# Patient Record
Sex: FEMALE | Race: WHITE | NOT HISPANIC OR LATINO | Employment: OTHER | URBAN - METROPOLITAN AREA
[De-identification: names, ages, dates, MRNs, and addresses within clinical notes are randomized per-mention and may not be internally consistent; named-entity substitution may affect disease eponyms.]

---

## 2020-09-24 ENCOUNTER — OFFICE VISIT (OUTPATIENT)
Dept: OBSTETRICS AND GYNECOLOGY | Facility: CLINIC | Age: 55
End: 2020-09-24
Payer: MEDICAID

## 2020-09-24 VITALS
SYSTOLIC BLOOD PRESSURE: 122 MMHG | BODY MASS INDEX: 27.25 KG/M2 | HEIGHT: 69 IN | DIASTOLIC BLOOD PRESSURE: 78 MMHG | WEIGHT: 184 LBS

## 2020-09-24 DIAGNOSIS — Z01.419 ROUTINE GYNECOLOGICAL EXAMINATION: Primary | ICD-10-CM

## 2020-09-24 DIAGNOSIS — D17.9 LIPOMA, UNSPECIFIED SITE: ICD-10-CM

## 2020-09-24 PROCEDURE — 99386 PREV VISIT NEW AGE 40-64: CPT | Mod: S$GLB,,, | Performed by: OBSTETRICS & GYNECOLOGY

## 2020-09-24 PROCEDURE — 99386 PR PREVENTIVE VISIT,NEW,40-64: ICD-10-PCS | Mod: S$GLB,,, | Performed by: OBSTETRICS & GYNECOLOGY

## 2020-09-24 RX ORDER — VENLAFAXINE HYDROCHLORIDE 150 MG/1
150 CAPSULE, EXTENDED RELEASE ORAL DAILY
COMMUNITY

## 2020-09-24 RX ORDER — DOCUSATE SODIUM 250 MG
250 CAPSULE ORAL DAILY
COMMUNITY
End: 2022-11-10

## 2020-09-24 RX ORDER — QUETIAPINE FUMARATE 100 MG/1
TABLET, FILM COATED ORAL
COMMUNITY
End: 2022-10-25 | Stop reason: DRUGHIGH

## 2020-09-24 NOTE — PROGRESS NOTES
Subjective:Pt c/o boil on pantieline x5 days       Patient ID: Elizabeth Gomez is a 55 y.o. female.    Chief Complaint:  Well Woman      History of Present Illness  HPI  Patient reports she has not been seen by gynecologist in several years.  She is due for her colonoscopy, has never had 1 previously.  Also has an area on right neck that would like evaluated or possibly resect did a.    Also within area described as a boy will on lower groin, has been draining spontaneously and is tender.    GYN & OB History  No LMP recorded. Patient has had a hysterectomy.   Date of Last Pap: No result found    OB History    Para Term  AB Living   1         1   SAB TAB Ectopic Multiple Live Births                  # Outcome Date GA Lbr Brenden/2nd Weight Sex Delivery Anes PTL Lv   1                 Review of Systems  Review of Systems   Constitutional: Negative for activity change, appetite change, fatigue, fever and unexpected weight change.   HENT: Negative for nasal congestion and tinnitus.    Eyes: Negative for visual disturbance.   Respiratory: Negative for cough and shortness of breath.    Cardiovascular: Negative for chest pain and leg swelling.   Gastrointestinal: Negative for abdominal pain, bloating, blood in stool, constipation and diarrhea.   Genitourinary: Positive for vaginal pain. Negative for bladder incontinence, decreased libido, dysmenorrhea, dyspareunia, dysuria, vaginal bleeding, vaginal discharge, vaginal dryness and vaginal odor.   Musculoskeletal: Negative for arthralgias, back pain and joint swelling.   Integumentary:  Negative for acne.   Neurological: Negative for headaches.   Psychiatric/Behavioral: Negative for depression and sleep disturbance. The patient is not nervous/anxious.            Objective:    Physical Exam:   Constitutional: She is oriented to person, place, and time. She appears well-developed and well-nourished. She is cooperative.       Small area 2 x 3 cm as  circumscribed in image nontender no erythema no purulence will      Neck: No thyroid mass and no thyromegaly present.    Cardiovascular: Normal rate and normal pulses.     Pulmonary/Chest: Effort normal. No respiratory distress. Chest wall is not dull to percussion. She exhibits no mass, no bony tenderness, no laceration, no crepitus, no edema, no deformity, no swelling and no retraction. Right breast exhibits no inverted nipple, no mass, no nipple discharge, no skin change, no tenderness, presence, no bleeding and no swelling. Left breast exhibits no inverted nipple, no mass, no nipple discharge, no skin change, no tenderness, presence, no bleeding and no swelling.        Abdominal: Soft. Normal appearance. She exhibits no distension. There is no abdominal tenderness. No hernia.     Genitourinary:    Vagina and rectum normal.            Pelvic exam was performed with patient supine.   There is no rash, tenderness, lesion or injury on the right labia. There is no rash, tenderness, lesion or injury on the left labia. Uterus is absent. Right adnexum displays no mass, no tenderness and no fullness. Left adnexum displays no mass, no tenderness and no fullness. No rectocele, cystocele or unspecified prolapse of vaginal walls in the vagina. Labial bartholins normal.Cervix exhibits absence. negative for vaginal discharge          Musculoskeletal: Moves all extremeties.       Neurological: She is alert and oriented to person, place, and time.    Skin: Skin is warm and dry. No rash noted.    Psychiatric: She has a normal mood and affect. Her speech is normal and behavior is normal. Judgment and thought content normal.          Assessment:     Well Visit  Groin with boil        Plan:      Plan    López ointment for area of boil.    Plan for referral to Gastroenterology for colonoscopy.    Plan for referral to general surgery for possible really palmar resection.    Also recommend mammogram and Pap smear performed today.

## 2020-09-27 RX ORDER — MUPIROCIN CALCIUM 20 MG/G
CREAM TOPICAL
Qty: 30 G | Refills: 0 | Status: SHIPPED | OUTPATIENT
Start: 2020-09-27 | End: 2021-09-27

## 2021-07-01 ENCOUNTER — PATIENT MESSAGE (OUTPATIENT)
Dept: ADMINISTRATIVE | Facility: OTHER | Age: 56
End: 2021-07-01

## 2022-01-31 ENCOUNTER — OFFICE VISIT (OUTPATIENT)
Dept: SURGERY | Facility: CLINIC | Age: 57
End: 2022-01-31
Payer: MEDICAID

## 2022-01-31 VITALS — WEIGHT: 184 LBS | BODY MASS INDEX: 27.25 KG/M2 | HEIGHT: 69 IN | RESPIRATION RATE: 18 BRPM

## 2022-01-31 DIAGNOSIS — L72.3 SEBACEOUS CYST: Primary | ICD-10-CM

## 2022-01-31 LAB
ABS NRBC COUNT: 0 THOU/UL (ref 0–0.01)
ABSOLUTE BASOPHIL: 0 10*3/UL (ref 0–0.3)
ABSOLUTE EOSINOPHIL: 0.2 10*3/UL (ref 0–0.6)
ABSOLUTE IMMATURE GRAN: 0.03 THOU/UL (ref 0–0.03)
ABSOLUTE LYMPHOCYTE: 3 10*3/UL (ref 1.2–4)
ABSOLUTE MONOCYTE: 0.5 10*3/UL (ref 0.1–0.8)
ANION GAP SERPL CALC-SCNC: 4 MMOL/L (ref 3–11)
APTT PPP: 47.4 SEC (ref 25.1–36.5)
BASOPHILS NFR BLD: 0.5 % (ref 0–3)
BUN SERPL-MCNC: 17 MG/DL (ref 7–18)
CALCIUM SERPL-MCNC: 8.8 MG/DL (ref 8.5–10.1)
CHLORIDE SERPL-SCNC: 105 MMOL/L (ref 98–107)
CO2 SERPL-SCNC: 28 MMOL/L (ref 21–32)
CREAT SERPL-MCNC: 1.23 MG/DL (ref 0.55–1.02)
EOSINOPHIL NFR BLD: 2 % (ref 0–6)
ERYTHROCYTE [DISTWIDTH] IN BLOOD BY AUTOMATED COUNT: 13.3 % (ref 0–15.5)
GFR ESTIMATION: 45
GLUCOSE SERPL-MCNC: 83 MG/DL (ref 74–106)
HCT VFR BLD AUTO: 39.7 % (ref 37–47)
HGB BLD-MCNC: 13.2 G/DL (ref 12–16)
IMMATURE GRANULOCYTES: 0.4 % (ref 0–0.43)
INR PPP: 1.1 INR (ref 0.9–1.1)
LYMPHOCYTES NFR BLD: 36.4 % (ref 20–45)
MCH RBC QN AUTO: 31.4 PG (ref 27–32)
MCHC RBC AUTO-ENTMCNC: 33.2 % (ref 32–36)
MCV RBC AUTO: 94.5 FL (ref 80–99)
MONOCYTES NFR BLD: 5.8 % (ref 2–10)
NEUTROPHILS # BLD AUTO: 4.5 10*3/UL (ref 1.4–7)
NEUTROPHILS NFR BLD: 54.9 % (ref 50–80)
NUCLEATED RED BLOOD CELLS: 0 % (ref 0–0.2)
PLATELETS: 314 10*3/UL (ref 130–400)
PMV BLD AUTO: 9.6 FL (ref 9.2–12.2)
POTASSIUM SERPL-SCNC: 3.9 MMOL/L (ref 3.5–5.1)
PROTHROMBIN TIME: 12.3 SEC (ref 10.2–12.9)
RBC # BLD AUTO: 4.2 10*6/UL (ref 4.2–5.4)
SODIUM BLD-SCNC: 137 MMOL/L (ref 131–143)
WBC # BLD: 8.2 10*3/UL (ref 4.5–10)

## 2022-01-31 PROCEDURE — 3008F PR BODY MASS INDEX (BMI) DOCUMENTED: ICD-10-PCS | Mod: CPTII,S$GLB,, | Performed by: SURGERY

## 2022-01-31 PROCEDURE — 1160F RVW MEDS BY RX/DR IN RCRD: CPT | Mod: CPTII,S$GLB,, | Performed by: SURGERY

## 2022-01-31 PROCEDURE — 1160F PR REVIEW ALL MEDS BY PRESCRIBER/CLIN PHARMACIST DOCUMENTED: ICD-10-PCS | Mod: CPTII,S$GLB,, | Performed by: SURGERY

## 2022-01-31 PROCEDURE — 3008F BODY MASS INDEX DOCD: CPT | Mod: CPTII,S$GLB,, | Performed by: SURGERY

## 2022-01-31 PROCEDURE — 99204 PR OFFICE/OUTPT VISIT, NEW, LEVL IV, 45-59 MIN: ICD-10-PCS | Mod: S$GLB,,, | Performed by: SURGERY

## 2022-01-31 PROCEDURE — 1159F MED LIST DOCD IN RCRD: CPT | Mod: CPTII,S$GLB,, | Performed by: SURGERY

## 2022-01-31 PROCEDURE — 1159F PR MEDICATION LIST DOCUMENTED IN MEDICAL RECORD: ICD-10-PCS | Mod: CPTII,S$GLB,, | Performed by: SURGERY

## 2022-01-31 PROCEDURE — 99204 OFFICE O/P NEW MOD 45 MIN: CPT | Mod: S$GLB,,, | Performed by: SURGERY

## 2022-01-31 RX ORDER — ALPRAZOLAM 0.25 MG/1
TABLET ORAL 3 TIMES DAILY
COMMUNITY
End: 2023-04-12

## 2022-01-31 NOTE — PROGRESS NOTES
Subjective:       Patient ID: Elizabeth Gomez is a 56 y.o. female.    Chief Complaint: New pt and Cyst of neck      This is a 48-year-old female with complaints of a cyst on the back of her neck that has been there for many years, states that it drains white cheesy smelly material.  Never got infected, not getting any better only getting larger.    Review of Systems   Constitutional: Negative for chills and fever.   HENT: Negative for nasal congestion and rhinorrhea.    Eyes: Negative for discharge and visual disturbance.   Respiratory: Negative for shortness of breath and wheezing.    Cardiovascular: Negative for chest pain and palpitations.   Gastrointestinal: Negative.  Negative for abdominal distention, abdominal pain, anal bleeding, blood in stool, constipation, diarrhea, nausea, rectal pain and vomiting.   Endocrine: Negative for cold intolerance and heat intolerance.   Genitourinary: Negative for difficulty urinating and frequency.   Musculoskeletal: Negative for back pain and myalgias.   Integumentary:  Negative for pallor and rash.   Neurological: Negative for dizziness and headaches.   Hematological: Negative for adenopathy. Does not bruise/bleed easily.   Psychiatric/Behavioral: Negative for behavioral problems. The patient is not nervous/anxious.          Objective:      Physical Exam  Constitutional:       General: Vital signs are normal.      Appearance: Normal appearance. She is well-developed and well-nourished.   HENT:      Head: Normocephalic and atraumatic.   Eyes:      General: Lids are normal.      Extraocular Movements: EOM normal.      Conjunctiva/sclera: Conjunctivae normal.   Neck:      Trachea: Trachea normal.   Cardiovascular:      Rate and Rhythm: Normal rate and regular rhythm.      Heart sounds: Normal heart sounds.   Pulmonary:      Effort: Pulmonary effort is normal.      Breath sounds: Normal breath sounds.   Abdominal:      General: Bowel sounds are normal. There is no distension.       Palpations: Abdomen is soft.      Tenderness: There is no abdominal tenderness.      Hernia: No hernia is present.   Musculoskeletal:      Cervical back: Normal range of motion.   Skin:     General: Skin is warm, dry and intact.             Comments: 4 cm x 4 sebaceous cyst on the posterior neck, not infected or draining   Neurological:      Mental Status: She is alert and oriented to person, place, and time.      Deep Tendon Reflexes: Strength normal.   Psychiatric:         Mood and Affect: Mood and affect normal.         Speech: Speech normal.         Behavior: Behavior normal. Behavior is cooperative.         Assessment:       Problem List Items Addressed This Visit    None     Visit Diagnoses     Sebaceous cyst    -  Primary          Plan:       48-year-old female with a sebaceous cyst the posterior midline neck, risks benefits surgical excision discussed with the patient detail, schedule patient for excision of sebaceous cyst

## 2022-02-01 ENCOUNTER — OUTSIDE PLACE OF SERVICE (OUTPATIENT)
Dept: GASTROENTEROLOGY | Facility: CLINIC | Age: 57
End: 2022-02-01
Payer: MEDICAID

## 2022-02-01 PROCEDURE — 45378 DIAGNOSTIC COLONOSCOPY: CPT | Mod: 52,,, | Performed by: INTERNAL MEDICINE

## 2022-02-01 PROCEDURE — 45378 PR COLONOSCOPY,DIAGNOSTIC: ICD-10-PCS | Mod: 52,,, | Performed by: INTERNAL MEDICINE

## 2022-02-10 ENCOUNTER — OUTSIDE PLACE OF SERVICE (OUTPATIENT)
Dept: SURGERY | Facility: CLINIC | Age: 57
End: 2022-02-10
Payer: MEDICAID

## 2022-02-10 PROCEDURE — 12041 INTMD RPR N-HF/GENIT 2.5CM/<: CPT | Mod: 51,,, | Performed by: SURGERY

## 2022-02-10 PROCEDURE — 11422 EXC H-F-NK-SP B9+MARG 1.1-2: CPT | Mod: ,,, | Performed by: SURGERY

## 2022-02-10 PROCEDURE — 11422 PR EXC SKIN BENIG 1.1-2 CM REMAINDR BODY: ICD-10-PCS | Mod: ,,, | Performed by: SURGERY

## 2022-02-10 PROCEDURE — 12041 PR LAYR CLOS WND REST BODY <2.5 CM: ICD-10-PCS | Mod: 51,,, | Performed by: SURGERY

## 2022-02-11 LAB — SPECIMEN TO PATHOLOGY: NORMAL

## 2022-03-21 DIAGNOSIS — Z86.010 HISTORY OF COLONIC POLYPS: Primary | ICD-10-CM

## 2022-03-21 DIAGNOSIS — Z12.11 SCREENING FOR COLON CANCER: ICD-10-CM

## 2022-03-21 NOTE — TELEPHONE ENCOUNTER
----- Message from Salima Villegas sent at 3/21/2022  3:46 PM CDT -----  Elizabeth Gomez would like for the nurse to give her a call back to reschedule her colonoscopy. [phone]    She said she had to cancel her last one because she threw up all her pills.

## 2022-03-24 ENCOUNTER — TELEPHONE (OUTPATIENT)
Dept: GASTROENTEROLOGY | Facility: CLINIC | Age: 57
End: 2022-03-24
Payer: MEDICAID

## 2022-03-24 DIAGNOSIS — Z86.010 HISTORY OF COLONIC POLYPS: Primary | ICD-10-CM

## 2022-03-24 DIAGNOSIS — Z12.11 SCREENING FOR COLON CANCER: ICD-10-CM

## 2022-03-24 NOTE — TELEPHONE ENCOUNTER
----- Message from Shahana Donohue sent at 3/24/2022  3:51 PM CDT -----  pt needs call back to schedule surgery (doesn't want pills)..[phone] (call back after 430 today)    vicente lucero

## 2022-03-25 RX ORDER — POLYETHYLENE GLYCOL 3350, SODIUM SULFATE ANHYDROUS, SODIUM BICARBONATE, SODIUM CHLORIDE, POTASSIUM CHLORIDE 236; 22.74; 6.74; 5.86; 2.97 G/4L; G/4L; G/4L; G/4L; G/4L
4 POWDER, FOR SOLUTION ORAL ONCE
Qty: 4000 ML | Refills: 0 | Status: SHIPPED | OUTPATIENT
Start: 2022-03-25 | End: 2022-03-25

## 2022-03-25 NOTE — TELEPHONE ENCOUNTER
"[facility]  [address] [place] [address]  Phone: [phone]  Fax: [phone]    History & Physical         Provider: Dr. Zakia Palm    Patient Name: Elizabeth MENDIETA (age):1965  56 y.o.           Gender: female   Phone: [phone]     Referring Physician: Klaudia Bustamante     Vital Signs:   5'9"  184 lb    Plan: Colonoscopy     Encounter Diagnoses   Name Primary?    History of colonic polyps Yes    Screening for colon cancer            History:      Past Medical History:   Diagnosis Date    Anxiety     Cervical cancer     History of cervical cancer     Uterine cancer       Past Surgical History:   Procedure Laterality Date    COLONOSCOPY      HYSTERECTOMY      SURGICAL REMOVAL OF LESION OF ORBIT Right       Medication List with Changes/Refills   New Medications    POLYETHYLENE GLYCOL (GOLYTELY,NULYTELY) 236-22.74-6.74 -5.86 GRAM SUSPENSION    Take 4,000 mLs (4 L total) by mouth once. for 1 dose   Current Medications    ALPRAZOLAM (XANAX) 0.25 MG TABLET    Take by mouth 3 (three) times daily.    DOCUSATE SODIUM (COLACE) 250 MG CAPSULE    Take 250 mg by mouth once daily.    QUETIAPINE (SEROQUEL) 100 MG TAB    Take by mouth.    VENLAFAXINE (EFFEXOR-XR) 150 MG CP24    Take 150 mg by mouth once daily.      Review of patient's allergies indicates:   Allergen Reactions    Codeine     Sulfa (sulfonamide antibiotics) Nausea And Vomiting      Family History   Problem Relation Age of Onset    Hypertension Mother     Bone cancer Maternal Grandfather     Hypertension Father     Melanoma Maternal Uncle     Lung cancer Maternal Uncle       Social History     Tobacco Use    Smoking status: Current Every Day Smoker     Types: Cigarettes    Smokeless tobacco: Never Used   Substance Use Topics    Alcohol use: Yes    Drug use: Never        Physical Examination:     General " Appearance:___________________________  HEENT: _____________________________________  Abdomen:____________________________________  Heart:________________________________________  Lungs:_______________________________________  Extremities:___________________________________  Skin:_________________________________________  Endocrine:____________________________________  Genitourinary:_________________________________  Neurological:__________________________________      Patient has been evaluated immediately prior to sedationa dn is medically cleared for endoscopy with IVCS as an ASA class: ______      Physician Signature: _________________________       Date: ________  Time: ________

## 2022-05-02 ENCOUNTER — TELEPHONE (OUTPATIENT)
Dept: GASTROENTEROLOGY | Facility: CLINIC | Age: 57
End: 2022-05-02
Payer: MEDICAID

## 2022-05-02 DIAGNOSIS — Z86.010 HISTORY OF COLONIC POLYPS: Primary | ICD-10-CM

## 2022-05-02 DIAGNOSIS — Z12.11 SCREENING FOR COLON CANCER: ICD-10-CM

## 2022-05-02 NOTE — TELEPHONE ENCOUNTER
"[facility]  [address] [place] [address]  Phone: [phone]  Fax: [phone]    History & Physical         Provider: Dr. Zakia Palm    Patient Name: Elizabeth MENDIETA (age):1965  56 y.o.           Gender: female   Phone: [phone]     Referring Physician: Klaudia Bustamante     Vital Signs:   Height - 5'9"  Weight - 184 lb  BMI -  27.2     Plan: Colonoscopy     Encounter Diagnoses   Name Primary?    History of colonic polyps Yes    Screening for colon cancer            History:      Past Medical History:   Diagnosis Date    Anxiety     Cervical cancer     History of cervical cancer     Uterine cancer       Past Surgical History:   Procedure Laterality Date    COLONOSCOPY      HYSTERECTOMY      SURGICAL REMOVAL OF LESION OF ORBIT Right       Medication List with Changes/Refills   Current Medications    ALPRAZOLAM (XANAX) 0.25 MG TABLET    Take by mouth 3 (three) times daily.    DOCUSATE SODIUM (COLACE) 250 MG CAPSULE    Take 250 mg by mouth once daily.    QUETIAPINE (SEROQUEL) 100 MG TAB    Take by mouth.    VENLAFAXINE (EFFEXOR-XR) 150 MG CP24    Take 150 mg by mouth once daily.      Review of patient's allergies indicates:   Allergen Reactions    Codeine     Sulfa (sulfonamide antibiotics) Nausea And Vomiting      Family History   Problem Relation Age of Onset    Hypertension Mother     Bone cancer Maternal Grandfather     Hypertension Father     Melanoma Maternal Uncle     Lung cancer Maternal Uncle       Social History     Tobacco Use    Smoking status: Current Every Day Smoker     Types: Cigarettes    Smokeless tobacco: Never Used   Substance Use Topics    Alcohol use: Yes    Drug use: Never        Physical Examination:     General Appearance:___________________________  HEENT: " _____________________________________  Abdomen:____________________________________  Heart:________________________________________  Lungs:_______________________________________  Extremities:___________________________________  Skin:_________________________________________  Endocrine:____________________________________  Genitourinary:_________________________________  Neurological:__________________________________      Patient has been evaluated immediately prior to sedation and is medically cleared for endoscopy with IVCS as an ASA class: ______      Physician Signature: _________________________       Date: ________  Time: ________

## 2022-05-03 ENCOUNTER — OUTSIDE PLACE OF SERVICE (OUTPATIENT)
Dept: GASTROENTEROLOGY | Facility: CLINIC | Age: 57
End: 2022-05-03
Payer: MEDICAID

## 2022-05-03 DIAGNOSIS — Z12.11 SCREENING FOR COLON CANCER: ICD-10-CM

## 2022-05-03 DIAGNOSIS — Z86.010 HISTORY OF COLONIC POLYPS: Primary | ICD-10-CM

## 2022-05-03 PROCEDURE — 45378 PR COLONOSCOPY,DIAGNOSTIC: ICD-10-PCS | Mod: 52,,, | Performed by: INTERNAL MEDICINE

## 2022-05-03 PROCEDURE — 45378 DIAGNOSTIC COLONOSCOPY: CPT | Mod: 52,,, | Performed by: INTERNAL MEDICINE

## 2022-05-03 RX ORDER — POLYETHYLENE GLYCOL 3350, SODIUM SULFATE ANHYDROUS, SODIUM BICARBONATE, SODIUM CHLORIDE, POTASSIUM CHLORIDE 236; 22.74; 6.74; 5.86; 2.97 G/4L; G/4L; G/4L; G/4L; G/4L
4 POWDER, FOR SOLUTION ORAL ONCE
Qty: 4000 ML | Refills: 0 | Status: SHIPPED | OUTPATIENT
Start: 2022-05-03 | End: 2022-05-03

## 2022-05-03 NOTE — TELEPHONE ENCOUNTER
Spoke with patient I let her know we were adding her tomorrow to [facility]. She understands to arrive at 8:30AM. She is to do a CLD all day today and must drink the entire 4 Liters of the Golytely even if she feels she is cleaned out. Dr. Plam will not scope her if she has not finished the entire bottle. Patient voiced understanding. I did repeat it to her to ensure she did understand.-EARNEST

## 2022-05-04 ENCOUNTER — OUTSIDE PLACE OF SERVICE (OUTPATIENT)
Dept: GASTROENTEROLOGY | Facility: CLINIC | Age: 57
End: 2022-05-04
Payer: MEDICAID

## 2022-05-04 LAB — CRC RECOMMENDATION EXT: NORMAL

## 2022-05-04 PROCEDURE — 45385 COLONOSCOPY W/LESION REMOVAL: CPT | Mod: ,,, | Performed by: INTERNAL MEDICINE

## 2022-05-04 PROCEDURE — 45385 PR COLONOSCOPY,REMV LESN,SNARE: ICD-10-PCS | Mod: ,,, | Performed by: INTERNAL MEDICINE

## 2022-05-06 LAB — SPECIMEN TO PATHOLOGY: NORMAL

## 2022-05-09 NOTE — PROGRESS NOTES
Results and recommendations discussed with patient, who voices understanding and agreement. They will contact us with any issues.  SHANIKAL, DHRUV

## 2022-05-09 NOTE — PROGRESS NOTES
3 TA, repeat colon w/adult colonoscope and agg prep (2d CLD, 8L GoLytely) in 3 years.  CMA to notify patient. Begin Linzess 290 daily and call in two weeks with update.  NBP

## 2022-05-12 ENCOUNTER — TELEPHONE (OUTPATIENT)
Dept: GASTROENTEROLOGY | Facility: CLINIC | Age: 57
End: 2022-05-12
Payer: MEDICAID

## 2022-05-12 NOTE — TELEPHONE ENCOUNTER
----- Message from Shahana Donohue sent at 5/12/2022 12:40 PM CDT -----  .Type:  Patient Returning Call    Who Called:SELF  Who Left Message for Patient: SAMREEN  Does the patient know what this is regarding?: NO  Would the patient rather a call back or a response via [facility]? CALL  Best Call Back Number:.[phone]

## 2022-06-07 ENCOUNTER — TELEPHONE (OUTPATIENT)
Dept: GASTROENTEROLOGY | Facility: CLINIC | Age: 57
End: 2022-06-07
Payer: MEDICAID

## 2022-06-07 NOTE — TELEPHONE ENCOUNTER
Pt reports that since her colonoscopy, she has had 4 episodes of fecal incontinence. Usually happens while she is asleep. She is going to stop the linzess until she hears back from our office.  SHANIKAL, DHRUV

## 2022-06-07 NOTE — TELEPHONE ENCOUNTER
----- Message from Shahana Donohue sent at 6/7/2022  8:33 AM CDT -----  pt needs call back, will elaborate..[phone] (home)

## 2022-06-08 NOTE — TELEPHONE ENCOUNTER
Decrease dose to Linzess 145 daily, use Squatty Potty every restroom visit at home. Get update in one month.  NBP

## 2022-06-09 NOTE — TELEPHONE ENCOUNTER
I spoke with the patient who reports that the fecal incontinence happened before she started linzess. She wanted to double check that you are sure you want to go down on the dose because when she was on that dose before, she became very constipated.  KDL, CMA

## 2022-06-10 NOTE — TELEPHONE ENCOUNTER
I may have been lost in the order of events. She had fecal incontinence before starting Linzess 290 daily. I am not clear if that incontinence got worse/better/same with Linzess. If worse with Linzess, then would decrease dose. If not worse (that is the same or better) then continue Linzess 290 daily and will get KUB to evaluate stool burden to see if her bowels are emptying well with the Linzess. I still rec the Squatty Potty.  NBP

## 2022-09-29 ENCOUNTER — PATIENT OUTREACH (OUTPATIENT)
Dept: ADMINISTRATIVE | Facility: HOSPITAL | Age: 57
End: 2022-09-29
Payer: MEDICAID

## 2022-09-29 DIAGNOSIS — K57.30 PANCOLONIC DIVERTICULOSIS: ICD-10-CM

## 2022-09-29 DIAGNOSIS — K64.8 INTERNAL HEMORRHOIDS: ICD-10-CM

## 2022-09-29 DIAGNOSIS — Z86.010 HISTORY OF COLON POLYPS: ICD-10-CM

## 2022-09-29 PROBLEM — Z86.0100 HISTORY OF COLON POLYPS: Status: ACTIVE | Noted: 2022-05-04

## 2022-10-04 ENCOUNTER — OFFICE VISIT (OUTPATIENT)
Dept: FAMILY MEDICINE | Facility: CLINIC | Age: 57
End: 2022-10-04
Payer: MEDICAID

## 2022-10-04 VITALS
OXYGEN SATURATION: 99 % | SYSTOLIC BLOOD PRESSURE: 126 MMHG | HEART RATE: 69 BPM | HEIGHT: 69 IN | WEIGHT: 155 LBS | TEMPERATURE: 98 F | DIASTOLIC BLOOD PRESSURE: 73 MMHG | RESPIRATION RATE: 18 BRPM | BODY MASS INDEX: 22.96 KG/M2

## 2022-10-04 DIAGNOSIS — R30.0 DYSURIA: ICD-10-CM

## 2022-10-04 DIAGNOSIS — Z11.59 ENCOUNTER FOR SCREENING FOR OTHER VIRAL DISEASES: ICD-10-CM

## 2022-10-04 DIAGNOSIS — M65.331 TRIGGER MIDDLE FINGER OF RIGHT HAND: ICD-10-CM

## 2022-10-04 DIAGNOSIS — M67.431 GANGLION CYST OF DORSUM OF RIGHT WRIST: Primary | ICD-10-CM

## 2022-10-04 DIAGNOSIS — Z12.31 BREAST CANCER SCREENING BY MAMMOGRAM: ICD-10-CM

## 2022-10-04 DIAGNOSIS — F41.1 GENERALIZED ANXIETY DISORDER: ICD-10-CM

## 2022-10-04 DIAGNOSIS — M19.042 ARTHRITIS OF BOTH HANDS: Chronic | ICD-10-CM

## 2022-10-04 DIAGNOSIS — L57.0 KERATOSIS, ACTINIC: ICD-10-CM

## 2022-10-04 DIAGNOSIS — Z00.00 ROUTINE ADULT HEALTH MAINTENANCE: ICD-10-CM

## 2022-10-04 DIAGNOSIS — M19.041 ARTHRITIS OF BOTH HANDS: Chronic | ICD-10-CM

## 2022-10-04 LAB
AMORPH URATE CRY URNS QL MICRO: ABNORMAL
BACTERIA #/AREA URNS HPF: ABNORMAL /[HPF]
BILIRUB UR QL STRIP: NEGATIVE
CHOLEST SERPL-MSCNC: 206 MG/DL (ref 100–200)
CLARITY UR: ABNORMAL
COLOR UR: YELLOW
EPITHELIAL CELLS: ABNORMAL
GLUCOSE (UA): NEGATIVE MG/DL
HCV IGG SERPL QL IA: NONREACTIVE
HDLC SERPL-MCNC: 80 MG/DL
HIV 1+2 AB+HIV1 P24 AG SERPL QL IA: NONREACTIVE
KETONES UR QL STRIP: ABNORMAL MG/DL
LDL/HDL RATIO: 1.3 (ref 1–3)
LDLC SERPL CALC-MCNC: 105.6 MG/DL (ref 0–100)
LEUKOCYTE ESTERASE UR QL STRIP: NEGATIVE
MUCOUS THREADS URNS QL MICRO: ABNORMAL
NITRITE UR QL STRIP: NEGATIVE
OCCULT BLOOD: ABNORMAL
PH, URINE: 6 (ref 5–7.5)
PROT UR QL STRIP: NEGATIVE MG/DL
RBC/HPF: ABNORMAL
SP GR UR STRIP: 1.02 (ref 1–1.03)
TRIGL SERPL-MCNC: 102 MG/DL (ref 0–150)
UROBILINOGEN, URINE: NORMAL E.U./DL (ref 0–1)
WBC/HPF: ABNORMAL

## 2022-10-04 PROCEDURE — 99203 PR OFFICE/OUTPT VISIT, NEW, LEVL III, 30-44 MIN: ICD-10-PCS | Mod: S$GLB,,, | Performed by: NURSE PRACTITIONER

## 2022-10-04 PROCEDURE — 1159F MED LIST DOCD IN RCRD: CPT | Mod: CPTII,S$GLB,, | Performed by: NURSE PRACTITIONER

## 2022-10-04 PROCEDURE — 3078F DIAST BP <80 MM HG: CPT | Mod: CPTII,S$GLB,, | Performed by: NURSE PRACTITIONER

## 2022-10-04 PROCEDURE — 1160F RVW MEDS BY RX/DR IN RCRD: CPT | Mod: CPTII,S$GLB,, | Performed by: NURSE PRACTITIONER

## 2022-10-04 PROCEDURE — 1159F PR MEDICATION LIST DOCUMENTED IN MEDICAL RECORD: ICD-10-PCS | Mod: CPTII,S$GLB,, | Performed by: NURSE PRACTITIONER

## 2022-10-04 PROCEDURE — 3074F PR MOST RECENT SYSTOLIC BLOOD PRESSURE < 130 MM HG: ICD-10-PCS | Mod: CPTII,S$GLB,, | Performed by: NURSE PRACTITIONER

## 2022-10-04 PROCEDURE — 3078F PR MOST RECENT DIASTOLIC BLOOD PRESSURE < 80 MM HG: ICD-10-PCS | Mod: CPTII,S$GLB,, | Performed by: NURSE PRACTITIONER

## 2022-10-04 PROCEDURE — 3074F SYST BP LT 130 MM HG: CPT | Mod: CPTII,S$GLB,, | Performed by: NURSE PRACTITIONER

## 2022-10-04 PROCEDURE — 3008F BODY MASS INDEX DOCD: CPT | Mod: CPTII,S$GLB,, | Performed by: NURSE PRACTITIONER

## 2022-10-04 PROCEDURE — 99203 OFFICE O/P NEW LOW 30 MIN: CPT | Mod: S$GLB,,, | Performed by: NURSE PRACTITIONER

## 2022-10-04 PROCEDURE — 3008F PR BODY MASS INDEX (BMI) DOCUMENTED: ICD-10-PCS | Mod: CPTII,S$GLB,, | Performed by: NURSE PRACTITIONER

## 2022-10-04 PROCEDURE — 1160F PR REVIEW ALL MEDS BY PRESCRIBER/CLIN PHARMACIST DOCUMENTED: ICD-10-PCS | Mod: CPTII,S$GLB,, | Performed by: NURSE PRACTITIONER

## 2022-10-04 RX ORDER — DICLOFENAC SODIUM 10 MG/G
2 GEL TOPICAL 4 TIMES DAILY
Qty: 100 G | Refills: 3 | Status: SHIPPED | OUTPATIENT
Start: 2022-10-04 | End: 2023-01-09 | Stop reason: SDUPTHER

## 2022-10-04 NOTE — PROGRESS NOTES
"Subjective:       Patient ID: Elizabeth Gomez is a 57 y.o. female.    Chief Complaint: Establish Care (Pt is here to establish care and a routine check up. Pt also has a few concerns she wants to discuss.)    Here to establish primary care. She lives in [place]. She is working on and off--she does [employer]. She is single. She smokes approx 1/2 ppd. She has tried hypnosis in the past which did help lessened frequency of smoking.   History of generalized anxiety disorder, uterine/cervical cancer. She thinks she may have been treated for hepatitis as a child--but unsure. Her mental health specialist is hCeikh Cuevas. Her gastroenterologist is Dr Zakia Palm.   She had sebasious cyst removed from neck in Feb 2022. She lost 2 bottom teeth during the surgery. She got dentures following the procendure. She reports approx 50 lb wieght loss following the procedure due to her inability to eat until she got her dentures.      She works in the sun as a  and has some sun spots. Melanoma runs in her family. She has several dark lesions that she would like to to get check out.     She has developed a ganglion cyst on dorsum right wrist and trigger finger middle finger right hand. Middle finger "sticking". 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     She also reports mild dysuria and flank discomfort, she is concerned she may have a UTI.                   Review of Systems   Constitutional:  Negative for activity change, appetite change and fever.   Respiratory:  Negative for chest tightness and shortness of breath.    Cardiovascular:  Negative for chest pain and palpitations.   Gastrointestinal:  Negative for abdominal pain, nausea and vomiting.   Musculoskeletal:  Positive for arthralgias. Negative for joint swelling and myalgias.   Skin:  Negative for color change and rash.   Neurological:  Negative for dizziness, weakness, light-headedness and headaches.   Hematological:  Negative for adenopathy. "   Psychiatric/Behavioral:  Negative for dysphoric mood and sleep disturbance. The patient is nervous/anxious.          Past Medical History:  Past Medical History:   Diagnosis Date    Anxiety     Cervical cancer     History of cervical cancer 1993    Uterine cancer       Past Surgical History:   Procedure Laterality Date    COLONOSCOPY      HYSTERECTOMY      SURGICAL REMOVAL OF LESION OF ORBIT Right       Review of patient's allergies indicates:   Allergen Reactions    Codeine     Sulfa (sulfonamide antibiotics) Nausea And Vomiting      Current Outpatient Medications   Medication Sig Dispense Refill    ALPRAZolam (XANAX) 0.25 MG tablet Take by mouth 3 (three) times daily.      docusate sodium (COLACE) 250 MG capsule Take 250 mg by mouth once daily.      QUEtiapine (SEROQUEL) 100 MG Tab Take by mouth.      venlafaxine (EFFEXOR-XR) 150 MG Cp24 Take 150 mg by mouth once daily.      diclofenac sodium (VOLTAREN) 1 % Gel Apply 2 g topically 4 (four) times daily. 100 g 3     No current facility-administered medications for this visit.     Social History     Socioeconomic History    Marital status: Single   Tobacco Use    Smoking status: Every Day     Types: Cigarettes    Smokeless tobacco: Never   Substance and Sexual Activity    Alcohol use: Yes    Drug use: Never    Sexual activity: Not Currently      Family History   Problem Relation Age of Onset    Hypertension Mother     Bone cancer Maternal Grandfather     Hypertension Father     Melanoma Maternal Uncle     Lung cancer Maternal Uncle         Objective:      Physical Exam  Constitutional:       Appearance: She is well-developed.   HENT:      Head: Normocephalic and atraumatic.   Eyes:      General: No scleral icterus.     Conjunctiva/sclera: Conjunctivae normal.      Pupils: Pupils are equal, round, and reactive to light.   Neck:      Thyroid: No thyroid mass.      Trachea: Trachea normal.   Cardiovascular:      Rate and Rhythm: Normal rate and regular rhythm.       Heart sounds: Normal heart sounds.   Pulmonary:      Effort: Pulmonary effort is normal.      Breath sounds: Normal breath sounds.   Musculoskeletal:         General: Swelling (olive-sized ganglion cyst dorsum left wrist) and deformity (heberdon's nodes DIP joints both hands) present.      Cervical back: Normal range of motion and neck supple.   Skin:     Findings: Lesion (skin eval completed; she has a small lesion on dorsum Lt wrist that is slightly dry consistent with actinic keratosis. This will be monitored for change a future appt.) present.   Neurological:      Mental Status: She is alert and oriented to person, place, and time.   Psychiatric:         Mood and Affect: Mood normal.         Behavior: Behavior normal.       Assessment:     1. Ganglion cyst of dorsum of right wrist Active   2. Arthritis of both hands Active   3. Trigger middle finger of right hand    4. Keratosis, actinic    5. Dysuria Active   6. Generalized anxiety disorder    7. Breast cancer screening by mammogram    8. Routine adult health maintenance    9. Encounter for screening for other viral diseases      Plan:       PROBLEM LIST     Ganglion cyst of dorsum of right wrist  Comments:  attempted to aspirate synovial fluid from small cyst, but only removed scant amt. Cyst may resolved since it was punctured w/ large bore needle. RTC in 3 wk     Arthritis of both hands  Comments:  start diclofenac gel as directed   Orders:  -     diclofenac sodium (VOLTAREN) 1 % Gel; Apply 2 g topically 4 (four) times daily.  Dispense: 100 g; Refill: 3    Trigger middle finger of right hand    Keratosis, actinic  Comments:  dorsum of left wrist; will continue to monitor this small lesion at future appts for any evidence of change/growth    Dysuria  Comments:  sending urine for C&S; will notify w/ results  Orders:  -     Urinalysis, Reflex to Urine Culture Urine, Clean Catch    Generalized anxiety disorder    Breast cancer screening by mammogram  -     Mammo  Digital Screening Bilat; Future; Expected date: 10/04/2022    Routine adult health maintenance  -     Lipid Panel    Encounter for screening for other viral diseases  -     Hepatitis C Antibody  -     HIV 1/2 Ag/Ab (4th Gen)      ** labs reveal + ketone, + moderate blood, trace leuk, neg Nitrites. I spoke with her on the phone. C&S still pending and I will notify w/ results in about 48 hour. Encouraged her to stop all fluids other than water. I am agreeable to her having tea x 2 in am, then water for the rest of the day. I intend to repeat UA at f/u appt in 3 wk to check for hematuria clearance. **

## 2022-10-12 ENCOUNTER — CLINICAL SUPPORT (OUTPATIENT)
Dept: FAMILY MEDICINE | Facility: CLINIC | Age: 57
End: 2022-10-12
Payer: MEDICAID

## 2022-10-12 ENCOUNTER — TELEPHONE (OUTPATIENT)
Dept: FAMILY MEDICINE | Facility: CLINIC | Age: 57
End: 2022-10-12
Payer: MEDICAID

## 2022-10-12 DIAGNOSIS — Z86.19 HISTORY OF HEPATITIS B: ICD-10-CM

## 2022-10-12 DIAGNOSIS — D64.9 ANEMIA, UNSPECIFIED TYPE: ICD-10-CM

## 2022-10-12 DIAGNOSIS — Z00.00 ROUTINE ADULT HEALTH MAINTENANCE: ICD-10-CM

## 2022-10-12 DIAGNOSIS — R30.0 DYSURIA: Primary | ICD-10-CM

## 2022-10-12 DIAGNOSIS — F41.1 GENERALIZED ANXIETY DISORDER: Primary | ICD-10-CM

## 2022-10-12 DIAGNOSIS — R31.29 OTHER MICROSCOPIC HEMATURIA: ICD-10-CM

## 2022-10-12 PROCEDURE — 99211 OFF/OP EST MAY X REQ PHY/QHP: CPT | Mod: 25,S$GLB,, | Performed by: INTERNAL MEDICINE

## 2022-10-12 PROCEDURE — 99211 PR OFFICE/OUTPT VISIT, EST, LEVL I: ICD-10-PCS | Mod: 25,S$GLB,, | Performed by: INTERNAL MEDICINE

## 2022-10-12 PROCEDURE — 81000 URINALYSIS NONAUTO W/SCOPE: CPT | Mod: S$GLB,,, | Performed by: NURSE PRACTITIONER

## 2022-10-12 PROCEDURE — 81000 CHG URINALYSIS, NONAUTO, W/SCOPE: ICD-10-PCS | Mod: S$GLB,,, | Performed by: NURSE PRACTITIONER

## 2022-10-12 NOTE — PROGRESS NOTES
Repeat dipstick UA show small amt blood in comparison to moderate amt of blood in urine last week. She has attempted to stay hydrated and she has cut back on tea intake     She has a prior hx of HBV, she is unsure if she was treated.

## 2022-10-12 NOTE — TELEPHONE ENCOUNTER
----- Message from Keerthi Knutson LPN sent at 10/11/2022  4:45 PM CDT -----  Regarding: FW: pt advice  Contact: pt    ----- Message -----  From: Alfreda Dumotn  Sent: 10/11/2022   2:29 PM CDT  To: Reyna Maher Staff  Subject: pt advice                                        Type:  Needs Medical Advice    Who Called:  Elizabeth Gomez  Symptoms (please be specific):  discolored urine, slight smell    How long has patient had these symptoms:   unk   Pharmacy name and phone #:       [facility] [address] [facility]  [address] [facility] [address]  Phone: [phone] Fax: [phone]    Would the patient rather a call back or a response via [facility]?  Call back   Best Call Back Number:  [phone]  Additional Information:            
22 year old  M, domiciled alone in [place], non-caregiver, with no formal diagnosed psych history but has taken psych meds while incarcerated, possible past psych hospitalization in [place], no past SA/SI, +cannabis use, +alcohol use (none since incarceration), BIB EMS activated by the crisis clinic for catatonia evaluation.   Patient presented to [facility] ED from [facility] and did not speak while in the ED. He exhibited some waxy flexibility, was minimally responsive, staring with a fixed gaze, some rigidity, negativism and was withdrawn making a diagnosis of catatonia the most likely. Patient appears internally preoccupied, fearful and looks around the room throughout the interview. He is not functioning at his baseline, is not currently in treatment and is an acute danger to himself. Patient will require psychiatric hospitalization for stabilization.

## 2022-10-21 ENCOUNTER — PATIENT MESSAGE (OUTPATIENT)
Dept: FAMILY MEDICINE | Facility: CLINIC | Age: 57
End: 2022-10-21
Payer: MEDICAID

## 2022-10-21 ENCOUNTER — TELEPHONE (OUTPATIENT)
Dept: FAMILY MEDICINE | Facility: CLINIC | Age: 57
End: 2022-10-21
Payer: MEDICAID

## 2022-10-21 NOTE — TELEPHONE ENCOUNTER
----- Message from Nika Orellana NP sent at 10/17/2022 11:05 AM CDT -----  Please tell her that it does not look like she has any active Hepatitis B

## 2022-10-25 ENCOUNTER — OFFICE VISIT (OUTPATIENT)
Dept: FAMILY MEDICINE | Facility: CLINIC | Age: 57
End: 2022-10-25
Payer: MEDICAID

## 2022-10-25 VITALS
HEART RATE: 100 BPM | WEIGHT: 159 LBS | TEMPERATURE: 98 F | DIASTOLIC BLOOD PRESSURE: 76 MMHG | RESPIRATION RATE: 18 BRPM | BODY MASS INDEX: 23.55 KG/M2 | OXYGEN SATURATION: 98 % | HEIGHT: 69 IN | SYSTOLIC BLOOD PRESSURE: 128 MMHG

## 2022-10-25 DIAGNOSIS — F17.210 CIGARETTE NICOTINE DEPENDENCE WITHOUT COMPLICATION: Chronic | ICD-10-CM

## 2022-10-25 DIAGNOSIS — M19.041 ARTHRITIS OF BOTH HANDS: Chronic | ICD-10-CM

## 2022-10-25 DIAGNOSIS — M65.331 TRIGGER MIDDLE FINGER OF RIGHT HAND: Chronic | ICD-10-CM

## 2022-10-25 DIAGNOSIS — F41.1 GENERALIZED ANXIETY DISORDER: Chronic | ICD-10-CM

## 2022-10-25 DIAGNOSIS — Z23 FLU VACCINE NEED: ICD-10-CM

## 2022-10-25 DIAGNOSIS — M67.431 GANGLION CYST OF DORSUM OF RIGHT WRIST: ICD-10-CM

## 2022-10-25 DIAGNOSIS — E78.00 PURE HYPERCHOLESTEROLEMIA: Primary | Chronic | ICD-10-CM

## 2022-10-25 DIAGNOSIS — M19.042 ARTHRITIS OF BOTH HANDS: Chronic | ICD-10-CM

## 2022-10-25 DIAGNOSIS — R31.21 ASYMPTOMATIC MICROSCOPIC HEMATURIA: ICD-10-CM

## 2022-10-25 PROBLEM — L57.0 KERATOSIS, ACTINIC: Status: ACTIVE | Noted: 2022-10-25

## 2022-10-25 LAB
BILIRUB SERPL-MCNC: NORMAL MG/DL
BLOOD URINE, POC: NORMAL
CLARITY, POC UA: CLEAR
COLOR, POC UA: NORMAL
GLUCOSE UR QL STRIP: NEGATIVE
KETONES UR QL STRIP: NORMAL
LEUKOCYTE ESTERASE URINE, POC: NEGATIVE
NITRITE, POC UA: NEGATIVE
PH, POC UA: 5.5
PROTEIN, POC: NEGATIVE
SPECIFIC GRAVITY, POC UA: 1.03
UROBILINOGEN, POC UA: 1

## 2022-10-25 PROCEDURE — 1159F PR MEDICATION LIST DOCUMENTED IN MEDICAL RECORD: ICD-10-PCS | Mod: CPTII,S$GLB,, | Performed by: NURSE PRACTITIONER

## 2022-10-25 PROCEDURE — 1160F PR REVIEW ALL MEDS BY PRESCRIBER/CLIN PHARMACIST DOCUMENTED: ICD-10-PCS | Mod: CPTII,S$GLB,, | Performed by: NURSE PRACTITIONER

## 2022-10-25 PROCEDURE — 3078F DIAST BP <80 MM HG: CPT | Mod: CPTII,S$GLB,, | Performed by: NURSE PRACTITIONER

## 2022-10-25 PROCEDURE — 90471 FLU VACCINE (QUAD) GREATER THAN OR EQUAL TO 3YO PRESERVATIVE FREE IM: ICD-10-PCS | Mod: S$GLB,,, | Performed by: NURSE PRACTITIONER

## 2022-10-25 PROCEDURE — 90471 IMMUNIZATION ADMIN: CPT | Mod: S$GLB,,, | Performed by: NURSE PRACTITIONER

## 2022-10-25 PROCEDURE — 81002 URINALYSIS NONAUTO W/O SCOPE: CPT | Mod: S$GLB,,, | Performed by: NURSE PRACTITIONER

## 2022-10-25 PROCEDURE — 1160F RVW MEDS BY RX/DR IN RCRD: CPT | Mod: CPTII,S$GLB,, | Performed by: NURSE PRACTITIONER

## 2022-10-25 PROCEDURE — 90686 IIV4 VACC NO PRSV 0.5 ML IM: CPT | Mod: S$GLB,,, | Performed by: NURSE PRACTITIONER

## 2022-10-25 PROCEDURE — 90686 FLU VACCINE (QUAD) GREATER THAN OR EQUAL TO 3YO PRESERVATIVE FREE IM: ICD-10-PCS | Mod: S$GLB,,, | Performed by: NURSE PRACTITIONER

## 2022-10-25 PROCEDURE — 1159F MED LIST DOCD IN RCRD: CPT | Mod: CPTII,S$GLB,, | Performed by: NURSE PRACTITIONER

## 2022-10-25 PROCEDURE — 3074F PR MOST RECENT SYSTOLIC BLOOD PRESSURE < 130 MM HG: ICD-10-PCS | Mod: CPTII,S$GLB,, | Performed by: NURSE PRACTITIONER

## 2022-10-25 PROCEDURE — 81002 POCT URINE DIPSTICK WITHOUT MICROSCOPE: ICD-10-PCS | Mod: S$GLB,,, | Performed by: NURSE PRACTITIONER

## 2022-10-25 PROCEDURE — 3074F SYST BP LT 130 MM HG: CPT | Mod: CPTII,S$GLB,, | Performed by: NURSE PRACTITIONER

## 2022-10-25 PROCEDURE — 99213 OFFICE O/P EST LOW 20 MIN: CPT | Mod: 25,S$GLB,, | Performed by: NURSE PRACTITIONER

## 2022-10-25 PROCEDURE — 99213 PR OFFICE/OUTPT VISIT, EST, LEVL III, 20-29 MIN: ICD-10-PCS | Mod: 25,S$GLB,, | Performed by: NURSE PRACTITIONER

## 2022-10-25 PROCEDURE — 3078F PR MOST RECENT DIASTOLIC BLOOD PRESSURE < 80 MM HG: ICD-10-PCS | Mod: CPTII,S$GLB,, | Performed by: NURSE PRACTITIONER

## 2022-10-25 RX ORDER — QUETIAPINE FUMARATE 400 MG/1
400 TABLET, FILM COATED ORAL DAILY
COMMUNITY
Start: 2022-10-14

## 2022-10-25 NOTE — PROGRESS NOTES
"Subjective:       Patient ID: Elizabeth Gomez is a 57 y.o. female.    Chief Complaint: Follow-up (Pt is here for a 3 week follow up and lab review.)    She lives in [place]. She is working on and off--she does [employer]. She is single. She smokes approx 1/2 ppd. She has tried hypnosis in the past which did help lessened frequency of smoking.   History of generalized anxiety disorder, uterine/cervical cancer.  Her mental health specialist is Cheikh Cuevas. Her gastroenterologist is Dr Zakia Palm.   She had sebasious cyst removed from neck in Feb 2022. She lost 2 bottom teeth during the surgery. She got dentures following the procendure. She reports approx 50 lb wieght loss following the procedure due to her inability to eat until she got her dentures.       She has developed a ganglion cyst on dorsum right wrist and trigger finger middle finger right hand. Middle finger "sticking". 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      At our previous appt she had hematuria without evidence of UTI. Dipstick revealed moderate blood. Repeating urine today for clearance of hematuria.     Hyperlipidemia    Type of hyperlipidemia: pure cholesterolemia  Duration: chronic  Control: usually well controlled; at goal  Compliance: compliant; compliant with diet; exercises  Complications: no coronary artery disease; no cerebral vascular disease, no peripheral artery disease  ASCVD:The 10-year ASCVD risk score (Luis YING, et al., 2019) is: 5%    Values used to calculate the score:      Age: 57 years      Sex: Female      Is Non- : No      Diabetic: No      Tobacco smoker: Yes      Systolic Blood Pressure: 128 mmHg      Is BP treated: No      HDL Cholesterol: 80 mg/dL      Total Cholesterol: 206 mg/dL         Review of Systems   Constitutional:  Negative for activity change, appetite change and fever.   Respiratory:  Negative for chest tightness and shortness of breath.    Cardiovascular:  Negative for " chest pain and palpitations.   Gastrointestinal:  Negative for abdominal pain, nausea and vomiting.   Musculoskeletal:  Positive for arthralgias. Negative for joint swelling and myalgias.   Skin:  Negative for color change and rash.   Neurological:  Negative for dizziness, weakness, light-headedness and headaches.   Hematological:  Negative for adenopathy.   Psychiatric/Behavioral:  Negative for dysphoric mood and sleep disturbance. The patient is nervous/anxious.          Past Medical History:  Past Medical History:   Diagnosis Date    Anxiety     Cervical cancer     Generalized anxiety disorder 10/25/2022    History of cervical cancer 1993    Pure hypercholesterolemia 10/26/2022    Uterine cancer       Past Surgical History:   Procedure Laterality Date    COLONOSCOPY      HYSTERECTOMY      SURGICAL REMOVAL OF LESION OF ORBIT Right       Review of patient's allergies indicates:   Allergen Reactions    Codeine     Sulfa (sulfonamide antibiotics) Nausea And Vomiting      Current Outpatient Medications   Medication Sig Dispense Refill    ALPRAZolam (XANAX) 0.25 MG tablet Take by mouth 3 (three) times daily.      diclofenac sodium (VOLTAREN) 1 % Gel Apply 2 g topically 4 (four) times daily. 100 g 3    docusate sodium (COLACE) 250 MG capsule Take 250 mg by mouth once daily.      QUEtiapine (SEROQUEL) 400 MG tablet Take 400 mg by mouth once daily.      venlafaxine (EFFEXOR-XR) 150 MG Cp24 Take 150 mg by mouth once daily.       No current facility-administered medications for this visit.     Social History     Socioeconomic History    Marital status: Single   Tobacco Use    Smoking status: Every Day     Types: Cigarettes    Smokeless tobacco: Never   Substance and Sexual Activity    Alcohol use: Yes    Drug use: Never    Sexual activity: Not Currently      Family History   Problem Relation Age of Onset    Hypertension Mother     Bone cancer Maternal Grandfather     Hypertension Father     Melanoma Maternal Uncle     Lung  cancer Maternal Uncle         Objective:      Physical Exam  Constitutional:       Appearance: She is well-developed.   HENT:      Head: Normocephalic and atraumatic.   Eyes:      General: No scleral icterus.     Conjunctiva/sclera: Conjunctivae normal.      Pupils: Pupils are equal, round, and reactive to light.   Neck:      Thyroid: No thyroid mass.      Trachea: Trachea normal.   Cardiovascular:      Rate and Rhythm: Normal rate and regular rhythm.      Heart sounds: Normal heart sounds.   Pulmonary:      Effort: Pulmonary effort is normal.      Breath sounds: Normal breath sounds.   Musculoskeletal:         General: Swelling (olive-sized ganglion cyst dorsum left wrist) and deformity (heberdon's nodes DIP joints both hands) present.      Cervical back: Normal range of motion and neck supple.   Skin:     Findings: Lesion (skin eval completed; she has a small lesion on dorsum Lt wrist that is slightly dry consistent with actinic keratosis. This will be monitored for change a future appt.) present.   Neurological:      Mental Status: She is alert and oriented to person, place, and time.   Psychiatric:         Mood and Affect: Mood normal.         Behavior: Behavior normal.       Assessment:     1. Pure hypercholesterolemia Stable   2. Trigger middle finger of right hand Active   3. Ganglion cyst of dorsum of right wrist    4. Arthritis of both hands Active   5. Asymptomatic microscopic hematuria Active   6. Generalized anxiety disorder Stable   7. Cigarette nicotine dependence without complication Active   8. Flu vaccine need      Plan:       PROBLEM LIST     Pure hypercholesterolemia  Comments:  mild hyperlipidemia; ASCVD score 5.0%; recommend starting daily Omega-3 supplement.     Trigger middle finger of right hand  Comments:  arranging ortho eval for release of trigger finger and aspiration or removal ganglion cyst  Orders:  -     Ambulatory referral/consult to Orthopedics; Future; Expected date:  11/01/2022    Ganglion cyst of dorsum of right wrist  -     Ambulatory referral/consult to Orthopedics; Future; Expected date: 11/01/2022    Arthritis of both hands  Comments:  she seems to be responding favorably to diclofenac gel; will continue    Asymptomatic microscopic hematuria  Comments:  small amt blood without evidence if infection. Continue hydration H2O; repeat UA in 6 week. Will arrange urology eval if asymptomatic hematuria is still present  Orders:  -     POCT urine dipstick without microscope    Generalized anxiety disorder    Cigarette nicotine dependence without complication  Comments:  offered referral to [facility]; she want to explore hypnosis for smoking cessation with a [place] therapist instead     Flu vaccine need  -     Influenza - Quadrivalent *Preferred* (6 months+) (PF)

## 2022-10-26 PROBLEM — E78.00 PURE HYPERCHOLESTEROLEMIA: Chronic | Status: ACTIVE | Noted: 2022-10-26

## 2022-10-26 PROBLEM — F17.210 CIGARETTE NICOTINE DEPENDENCE WITHOUT COMPLICATION: Chronic | Status: ACTIVE | Noted: 2022-10-26

## 2022-11-03 ENCOUNTER — OFFICE VISIT (OUTPATIENT)
Dept: ORTHOPEDICS | Facility: CLINIC | Age: 57
End: 2022-11-03
Payer: MEDICAID

## 2022-11-03 DIAGNOSIS — M65.331 TRIGGER MIDDLE FINGER OF RIGHT HAND: Chronic | ICD-10-CM

## 2022-11-03 DIAGNOSIS — M67.431 GANGLION CYST OF DORSUM OF RIGHT WRIST: ICD-10-CM

## 2022-11-03 DIAGNOSIS — G56.03 BILATERAL CARPAL TUNNEL SYNDROME: Primary | ICD-10-CM

## 2022-11-03 PROCEDURE — 99202 PR OFFICE/OUTPT VISIT, NEW, LEVL II, 15-29 MIN: ICD-10-PCS | Mod: S$GLB,,, | Performed by: ORTHOPAEDIC SURGERY

## 2022-11-03 PROCEDURE — 1159F MED LIST DOCD IN RCRD: CPT | Mod: CPTII,S$GLB,, | Performed by: ORTHOPAEDIC SURGERY

## 2022-11-03 PROCEDURE — 1160F PR REVIEW ALL MEDS BY PRESCRIBER/CLIN PHARMACIST DOCUMENTED: ICD-10-PCS | Mod: CPTII,S$GLB,, | Performed by: ORTHOPAEDIC SURGERY

## 2022-11-03 PROCEDURE — 1160F RVW MEDS BY RX/DR IN RCRD: CPT | Mod: CPTII,S$GLB,, | Performed by: ORTHOPAEDIC SURGERY

## 2022-11-03 PROCEDURE — 99202 OFFICE O/P NEW SF 15 MIN: CPT | Mod: S$GLB,,, | Performed by: ORTHOPAEDIC SURGERY

## 2022-11-03 PROCEDURE — 1159F PR MEDICATION LIST DOCUMENTED IN MEDICAL RECORD: ICD-10-PCS | Mod: CPTII,S$GLB,, | Performed by: ORTHOPAEDIC SURGERY

## 2022-11-03 NOTE — PROGRESS NOTES
Subjective:      Patient ID: Elizabeth Gomez is a 57 y.o. female.    Chief Complaint: Pain of the Right Hand and Pain of the Right Wrist    HPI 57-year-old lady comes in with symptoms of carpal tunnel syndrome and both hands.  She has a right middle finger trigger finger and a right dorsal ganglion cyst.  She apparently had an attempted aspiration of the cyst prior primary care provider without return.    Review of Systems   Constitutional: Negative for fever and weight loss.   Cardiovascular:  Negative for chest pain and leg swelling.   Musculoskeletal:  Negative for arthritis, joint pain, joint swelling, muscle weakness and stiffness.   Gastrointestinal:  Negative for change in bowel habit.   Genitourinary:  Negative for bladder incontinence and hematuria.   Neurological:  Positive for focal weakness, numbness, paresthesias and sensory change.       Objective:      Active and passive range of motion of the right wrist and hand is normal.  She has a tender nodule in the region of the A1 pulley and mild triggering with range of motion of the middle finger.  She also has a dorsal ganglion cyst over the distal end of the ulna.  She has decreased sensation in the median nerve distribution and a positive carpal compression test.      Ortho/SPM Exam            Assessment:       Encounter Diagnoses   Name Primary?    Trigger middle finger of right hand     Ganglion cyst of dorsum of right wrist     Bilateral carpal tunnel syndrome Yes          Plan:       Elizabeth was seen today for pain and pain.    Diagnoses and all orders for this visit:    Bilateral carpal tunnel syndrome    Trigger middle finger of right hand  Comments:  arranging ortho eval for release of trigger finger and aspiration or removal ganglion cyst  Orders:  -     Ambulatory referral/consult to Orthopedics    Ganglion cyst of dorsum of right wrist  -     Ambulatory referral/consult to Orthopedics    Recommend EMG and nerve conduction studies.  She will be seen  back with that exam.

## 2022-11-10 ENCOUNTER — OFFICE VISIT (OUTPATIENT)
Dept: ORTHOPEDICS | Facility: CLINIC | Age: 57
End: 2022-11-10
Payer: MEDICAID

## 2022-11-10 DIAGNOSIS — M65.331 TRIGGER MIDDLE FINGER OF RIGHT HAND: ICD-10-CM

## 2022-11-10 DIAGNOSIS — G56.03 BILATERAL CARPAL TUNNEL SYNDROME: ICD-10-CM

## 2022-11-10 DIAGNOSIS — M67.431 GANGLION CYST OF DORSUM OF RIGHT WRIST: Primary | ICD-10-CM

## 2022-11-10 PROCEDURE — 1160F RVW MEDS BY RX/DR IN RCRD: CPT | Mod: CPTII,S$GLB,, | Performed by: ORTHOPAEDIC SURGERY

## 2022-11-10 PROCEDURE — 1159F PR MEDICATION LIST DOCUMENTED IN MEDICAL RECORD: ICD-10-PCS | Mod: CPTII,S$GLB,, | Performed by: ORTHOPAEDIC SURGERY

## 2022-11-10 PROCEDURE — 99213 PR OFFICE/OUTPT VISIT, EST, LEVL III, 20-29 MIN: ICD-10-PCS | Mod: S$GLB,,, | Performed by: ORTHOPAEDIC SURGERY

## 2022-11-10 PROCEDURE — 1159F MED LIST DOCD IN RCRD: CPT | Mod: CPTII,S$GLB,, | Performed by: ORTHOPAEDIC SURGERY

## 2022-11-10 PROCEDURE — 1160F PR REVIEW ALL MEDS BY PRESCRIBER/CLIN PHARMACIST DOCUMENTED: ICD-10-PCS | Mod: CPTII,S$GLB,, | Performed by: ORTHOPAEDIC SURGERY

## 2022-11-10 PROCEDURE — 99213 OFFICE O/P EST LOW 20 MIN: CPT | Mod: S$GLB,,, | Performed by: ORTHOPAEDIC SURGERY

## 2022-11-10 RX ORDER — ALPRAZOLAM 1 MG/1
TABLET ORAL
COMMUNITY
Start: 2022-11-09

## 2022-11-10 NOTE — PROGRESS NOTES
Subjective:      Patient ID: Elizabeth Gomez is a 57 y.o. female.    Chief Complaint: Pain of the Right Hand and Pain of the Left Hand    HPI patient comes in today for review of her nerve conduction studies.  It shows moderate carpal tunnel syndrome.  She is still symptomatic from her middle finger trigger finger and dorsal ganglion cyst.    ROS unchanged from prior visit      Objective:      Patient has decreased sensation to light touch in the median nerve distribution.  She has a 1 cm dorsal ganglion cyst over the distal end of the ulna.  She has a tender nodule in the region of the A1 pulley of the middle finger with triggering.      Ortho/SPM Exam            Assessment:       Encounter Diagnoses   Name Primary?    Ganglion cyst of dorsum of right wrist Yes    Trigger middle finger of right hand     Bilateral carpal tunnel syndrome           Plan:       Elizabeth was seen today for pain and pain.    Diagnoses and all orders for this visit:    Ganglion cyst of dorsum of right wrist    Trigger middle finger of right hand    Bilateral carpal tunnel syndrome    Recommend carpal tunnel release, ganglionectomy and trigger finger release.

## 2022-11-16 ENCOUNTER — PATIENT MESSAGE (OUTPATIENT)
Dept: ADMINISTRATIVE | Facility: HOSPITAL | Age: 57
End: 2022-11-16
Payer: MEDICAID

## 2022-12-16 LAB
ANION GAP SERPL CALC-SCNC: 10 MMOL/L (ref 3–11)
BASOPHILS NFR BLD: 0.3 % (ref 0–3)
BUN SERPL-MCNC: 22 MG/DL (ref 7–18)
BUN/CREAT SERPL: 29.72 RATIO (ref 7–18)
CALCIUM SERPL-MCNC: 8.6 MG/DL (ref 8.8–10.5)
CHLORIDE SERPL-SCNC: 103 MMOL/L (ref 100–108)
CO2 SERPL-SCNC: 20 MMOL/L (ref 21–32)
CREAT SERPL-MCNC: 0.74 MG/DL (ref 0.55–1.02)
EOSINOPHIL NFR BLD: 2 % (ref 1–3)
ERYTHROCYTE [DISTWIDTH] IN BLOOD BY AUTOMATED COUNT: 13.2 % (ref 12.5–18)
GFR ESTIMATION: > 60
GLUCOSE SERPL-MCNC: 76 MG/DL (ref 70–110)
HCT VFR BLD AUTO: 41.2 % (ref 37–47)
HGB BLD-MCNC: 14 G/DL (ref 12–16)
LYMPHOCYTES NFR BLD: 30.4 % (ref 25–40)
MCH RBC QN AUTO: 32 PG (ref 27–31.2)
MCHC RBC AUTO-ENTMCNC: 34 G/DL (ref 31.8–35.4)
MCV RBC AUTO: 94.1 FL (ref 80–97)
MONOCYTES NFR BLD: 4.5 % (ref 1–15)
NEUTROPHILS # BLD AUTO: 5.41 10*3/UL (ref 1.8–7.7)
NEUTROPHILS NFR BLD: 62.5 % (ref 37–80)
NUCLEATED RED BLOOD CELLS: 0 %
PLATELETS: 254 10*3/UL (ref 142–424)
POTASSIUM SERPL-SCNC: 4.6 MMOL/L (ref 3.6–5.2)
RBC # BLD AUTO: 4.38 10*6/UL (ref 4.2–5.4)
SODIUM BLD-SCNC: 133 MMOL/L (ref 135–145)
WBC # BLD: 8.7 10*3/UL (ref 4.6–10.2)

## 2022-12-20 ENCOUNTER — OUTSIDE PLACE OF SERVICE (OUTPATIENT)
Dept: ORTHOPEDICS | Facility: CLINIC | Age: 57
End: 2022-12-20
Payer: MEDICAID

## 2022-12-20 PROCEDURE — 26055 PR INCISE FINGER TENDON SHEATH: ICD-10-PCS | Mod: 51,F7,, | Performed by: ORTHOPAEDIC SURGERY

## 2022-12-20 PROCEDURE — 26055 INCISE FINGER TENDON SHEATH: CPT | Mod: 51,F7,, | Performed by: ORTHOPAEDIC SURGERY

## 2022-12-20 PROCEDURE — 64721 CARPAL TUNNEL SURGERY: CPT | Mod: RT,,, | Performed by: ORTHOPAEDIC SURGERY

## 2022-12-20 PROCEDURE — 64721 PR REVISE MEDIAN N/CARPAL TUNNEL SURG: ICD-10-PCS | Mod: RT,,, | Performed by: ORTHOPAEDIC SURGERY

## 2022-12-20 PROCEDURE — 25111 PR EXCIS PRIMARY GANGLION WRIST: ICD-10-PCS | Mod: 59,51,RT, | Performed by: ORTHOPAEDIC SURGERY

## 2022-12-20 PROCEDURE — 25111 REMOVE WRIST TENDON LESION: CPT | Mod: 59,51,RT, | Performed by: ORTHOPAEDIC SURGERY

## 2022-12-27 ENCOUNTER — OFFICE VISIT (OUTPATIENT)
Dept: ORTHOPEDICS | Facility: CLINIC | Age: 57
End: 2022-12-27
Payer: MEDICAID

## 2022-12-27 DIAGNOSIS — G56.03 BILATERAL CARPAL TUNNEL SYNDROME: ICD-10-CM

## 2022-12-27 DIAGNOSIS — M65.331 TRIGGER MIDDLE FINGER OF RIGHT HAND: ICD-10-CM

## 2022-12-27 DIAGNOSIS — M67.431 GANGLION CYST OF DORSUM OF RIGHT WRIST: Primary | ICD-10-CM

## 2022-12-27 PROCEDURE — 1159F PR MEDICATION LIST DOCUMENTED IN MEDICAL RECORD: ICD-10-PCS | Mod: CPTII,S$GLB,, | Performed by: ORTHOPAEDIC SURGERY

## 2022-12-27 PROCEDURE — 99024 POSTOP FOLLOW-UP VISIT: CPT | Mod: S$GLB,,, | Performed by: ORTHOPAEDIC SURGERY

## 2022-12-27 PROCEDURE — 1160F PR REVIEW ALL MEDS BY PRESCRIBER/CLIN PHARMACIST DOCUMENTED: ICD-10-PCS | Mod: CPTII,S$GLB,, | Performed by: ORTHOPAEDIC SURGERY

## 2022-12-27 PROCEDURE — 1160F RVW MEDS BY RX/DR IN RCRD: CPT | Mod: CPTII,S$GLB,, | Performed by: ORTHOPAEDIC SURGERY

## 2022-12-27 PROCEDURE — 99024 PR POST-OP FOLLOW-UP VISIT: ICD-10-PCS | Mod: S$GLB,,, | Performed by: ORTHOPAEDIC SURGERY

## 2022-12-27 PROCEDURE — 1159F MED LIST DOCD IN RCRD: CPT | Mod: CPTII,S$GLB,, | Performed by: ORTHOPAEDIC SURGERY

## 2022-12-27 NOTE — PROGRESS NOTES
Subjective:      Patient ID: Elizabeth Gomez is a 57 y.o. female.    Chief Complaint: Post-op Evaluation of the Right Hand and Follow-up    HPI patient is 1 week postop trigger finger release carpal tunnel release and ganglionectomy.    ROS unchanged from prior visit      Objective:      Incisions are all clean.  The sutures were removed from her trigger finger incision.        Ortho/SPM Exam            Assessment:       Encounter Diagnoses   Name Primary?    Ganglion cyst of dorsum of right wrist Yes    Trigger middle finger of right hand     Bilateral carpal tunnel syndrome           Plan:       Elizabeth was seen today for follow-up and post-op evaluation.    Diagnoses and all orders for this visit:    Ganglion cyst of dorsum of right wrist    Trigger middle finger of right hand    Bilateral carpal tunnel syndrome    Return 1 week for suture removal

## 2022-12-29 ENCOUNTER — TELEPHONE (OUTPATIENT)
Dept: ORTHOPEDICS | Facility: CLINIC | Age: 57
End: 2022-12-29
Payer: MEDICAID

## 2022-12-29 NOTE — TELEPHONE ENCOUNTER
12/29/22  4:25 pm  Spoke w/ patient    She thinks she may have a fever.  She is waiting for someone to bring her a thermomiter.    I will call her at 8:00 am tomorrow. If needed, will sent in an antibiotic.

## 2022-12-29 NOTE — TELEPHONE ENCOUNTER
----- Message from rSavani Cavazos sent at 12/29/2022  4:14 PM CST -----  Type:  Needs Medical Advice    Who Called: Elizabeth Gomez    Symptoms (please be specific): running fever    How long has patient had these symptoms:  -  Pharmacy name and phone #:  -  Would the patient rather a call back or a response via [facility]? -  Best Call Back Number: [phone]    Additional Information:  had procedure on last week( right hand ) pt says her body is hot would like to speak w/nurse

## 2023-01-03 ENCOUNTER — TELEPHONE (OUTPATIENT)
Dept: FAMILY MEDICINE | Facility: CLINIC | Age: 58
End: 2023-01-03

## 2023-01-03 NOTE — TELEPHONE ENCOUNTER
----- Message from Kyra Espinoza sent at 1/2/2023  3:42 PM CST -----  Regarding: Appt Request  Pt is scheduled to see Dr. Butler on Thursday 1/5 and would like to r/s her appt w/ BENOIT Orellana for the same day. Please contact pt to r/s.

## 2023-01-05 ENCOUNTER — OFFICE VISIT (OUTPATIENT)
Dept: ORTHOPEDICS | Facility: CLINIC | Age: 58
End: 2023-01-05
Payer: MEDICAID

## 2023-01-05 DIAGNOSIS — M65.331 TRIGGER MIDDLE FINGER OF RIGHT HAND: ICD-10-CM

## 2023-01-05 DIAGNOSIS — M67.431 GANGLION CYST OF DORSUM OF RIGHT WRIST: Primary | ICD-10-CM

## 2023-01-05 DIAGNOSIS — G56.03 BILATERAL CARPAL TUNNEL SYNDROME: ICD-10-CM

## 2023-01-05 PROCEDURE — 99024 PR POST-OP FOLLOW-UP VISIT: ICD-10-PCS | Mod: S$GLB,,, | Performed by: ORTHOPAEDIC SURGERY

## 2023-01-05 PROCEDURE — 1160F PR REVIEW ALL MEDS BY PRESCRIBER/CLIN PHARMACIST DOCUMENTED: ICD-10-PCS | Mod: CPTII,S$GLB,, | Performed by: ORTHOPAEDIC SURGERY

## 2023-01-05 PROCEDURE — 1159F PR MEDICATION LIST DOCUMENTED IN MEDICAL RECORD: ICD-10-PCS | Mod: CPTII,S$GLB,, | Performed by: ORTHOPAEDIC SURGERY

## 2023-01-05 PROCEDURE — 1159F MED LIST DOCD IN RCRD: CPT | Mod: CPTII,S$GLB,, | Performed by: ORTHOPAEDIC SURGERY

## 2023-01-05 PROCEDURE — 99024 POSTOP FOLLOW-UP VISIT: CPT | Mod: S$GLB,,, | Performed by: ORTHOPAEDIC SURGERY

## 2023-01-05 PROCEDURE — 1160F RVW MEDS BY RX/DR IN RCRD: CPT | Mod: CPTII,S$GLB,, | Performed by: ORTHOPAEDIC SURGERY

## 2023-01-05 RX ORDER — HYDROCODONE BITARTRATE AND ACETAMINOPHEN 5; 325 MG/1; MG/1
TABLET ORAL
COMMUNITY
Start: 2022-12-20 | End: 2023-04-12

## 2023-01-09 ENCOUNTER — OFFICE VISIT (OUTPATIENT)
Dept: FAMILY MEDICINE | Facility: CLINIC | Age: 58
End: 2023-01-09
Payer: MEDICAID

## 2023-01-09 VITALS
TEMPERATURE: 97 F | DIASTOLIC BLOOD PRESSURE: 83 MMHG | RESPIRATION RATE: 18 BRPM | OXYGEN SATURATION: 97 % | SYSTOLIC BLOOD PRESSURE: 136 MMHG | HEIGHT: 69 IN | WEIGHT: 162 LBS | HEART RATE: 86 BPM | BODY MASS INDEX: 23.99 KG/M2

## 2023-01-09 DIAGNOSIS — M19.041 ARTHRITIS OF BOTH HANDS: Chronic | ICD-10-CM

## 2023-01-09 DIAGNOSIS — R31.21 ASYMPTOMATIC MICROSCOPIC HEMATURIA: Primary | Chronic | ICD-10-CM

## 2023-01-09 DIAGNOSIS — Z23 NEED FOR PROPHYLACTIC VACCINATION WITH STREPTOCOCCUS PNEUMONIAE (PNEUMOCOCCUS) AND INFLUENZA VACCINES: ICD-10-CM

## 2023-01-09 DIAGNOSIS — U07.1 COVID-19 VIRUS INFECTION: ICD-10-CM

## 2023-01-09 DIAGNOSIS — M19.042 ARTHRITIS OF BOTH HANDS: Chronic | ICD-10-CM

## 2023-01-09 LAB
BILIRUB SERPL-MCNC: NORMAL MG/DL
BLOOD URINE, POC: NORMAL
CLARITY, POC UA: NORMAL
COLOR, POC UA: NORMAL
GLUCOSE UR QL STRIP: NEGATIVE
KETONES UR QL STRIP: NEGATIVE
LEUKOCYTE ESTERASE URINE, POC: NEGATIVE
NITRITE, POC UA: NEGATIVE
PH, POC UA: 5.5
PROTEIN, POC: NEGATIVE
SPECIFIC GRAVITY, POC UA: 1.03
UROBILINOGEN, POC UA: 0.2

## 2023-01-09 PROCEDURE — 1159F MED LIST DOCD IN RCRD: CPT | Mod: CPTII,S$GLB,, | Performed by: NURSE PRACTITIONER

## 2023-01-09 PROCEDURE — 3075F SYST BP GE 130 - 139MM HG: CPT | Mod: CPTII,S$GLB,, | Performed by: NURSE PRACTITIONER

## 2023-01-09 PROCEDURE — 3079F DIAST BP 80-89 MM HG: CPT | Mod: CPTII,S$GLB,, | Performed by: NURSE PRACTITIONER

## 2023-01-09 PROCEDURE — 90471 PNEUMOCOCCAL CONJUGATE VACCINE 20-VALENT: ICD-10-PCS | Mod: S$GLB,,, | Performed by: NURSE PRACTITIONER

## 2023-01-09 PROCEDURE — 81002 POCT URINE DIPSTICK WITHOUT MICROSCOPE: ICD-10-PCS | Mod: S$GLB,,, | Performed by: NURSE PRACTITIONER

## 2023-01-09 PROCEDURE — 90471 IMMUNIZATION ADMIN: CPT | Mod: S$GLB,,, | Performed by: NURSE PRACTITIONER

## 2023-01-09 PROCEDURE — 1159F PR MEDICATION LIST DOCUMENTED IN MEDICAL RECORD: ICD-10-PCS | Mod: CPTII,S$GLB,, | Performed by: NURSE PRACTITIONER

## 2023-01-09 PROCEDURE — 3075F PR MOST RECENT SYSTOLIC BLOOD PRESS GE 130-139MM HG: ICD-10-PCS | Mod: CPTII,S$GLB,, | Performed by: NURSE PRACTITIONER

## 2023-01-09 PROCEDURE — 90677 PCV20 VACCINE IM: CPT | Mod: S$GLB,,, | Performed by: NURSE PRACTITIONER

## 2023-01-09 PROCEDURE — 81002 URINALYSIS NONAUTO W/O SCOPE: CPT | Mod: S$GLB,,, | Performed by: NURSE PRACTITIONER

## 2023-01-09 PROCEDURE — 99213 OFFICE O/P EST LOW 20 MIN: CPT | Mod: 25,S$GLB,, | Performed by: NURSE PRACTITIONER

## 2023-01-09 PROCEDURE — 90677 PNEUMOCOCCAL CONJUGATE VACCINE 20-VALENT: ICD-10-PCS | Mod: S$GLB,,, | Performed by: NURSE PRACTITIONER

## 2023-01-09 PROCEDURE — 3079F PR MOST RECENT DIASTOLIC BLOOD PRESSURE 80-89 MM HG: ICD-10-PCS | Mod: CPTII,S$GLB,, | Performed by: NURSE PRACTITIONER

## 2023-01-09 PROCEDURE — 3008F PR BODY MASS INDEX (BMI) DOCUMENTED: ICD-10-PCS | Mod: CPTII,S$GLB,, | Performed by: NURSE PRACTITIONER

## 2023-01-09 PROCEDURE — 3008F BODY MASS INDEX DOCD: CPT | Mod: CPTII,S$GLB,, | Performed by: NURSE PRACTITIONER

## 2023-01-09 PROCEDURE — 99213 PR OFFICE/OUTPT VISIT, EST, LEVL III, 20-29 MIN: ICD-10-PCS | Mod: 25,S$GLB,, | Performed by: NURSE PRACTITIONER

## 2023-01-09 RX ORDER — BROMPHENIRAMINE MALEATE, PSEUDOEPHEDRINE HYDROCHLORIDE, AND DEXTROMETHORPHAN HYDROBROMIDE 2; 30; 10 MG/5ML; MG/5ML; MG/5ML
10 SYRUP ORAL EVERY 4 HOURS PRN
Qty: 240 ML | Refills: 0 | Status: SHIPPED | OUTPATIENT
Start: 2023-01-09 | End: 2023-01-19

## 2023-01-09 RX ORDER — DICLOFENAC SODIUM 10 MG/G
2 GEL TOPICAL 4 TIMES DAILY
Qty: 100 G | Refills: 3 | Status: SHIPPED | OUTPATIENT
Start: 2023-01-09

## 2023-01-09 NOTE — PROGRESS NOTES
Subjective:       Patient ID: Elizabeth Gomez is a 57 y.o. female.    Chief Complaint: Follow-up (Pt is here for a 6 week follow up. Pt also has a concern about her lip and forehead.)    She lives in [place]. She is working on and off--she does [employer]. She is single. She smokes approx 1/2 ppd. She has tried hypnosis in the past which did help lessened frequency of smoking.   History of generalized anxiety disorder, uterine/cervical cancer, HLD, and OA mainly affecting hands. Her mental health specialist is Cheikh Cuevas. Her gastroenterologist is Dr Zakia Palm.     S/p removal ganglion cyst; she has f/u appt with Dr Butler in 2 weeks. Site healing appropriately.     Recent Covid-19 infection diagnosed on 12/30/22 at local ambulatory clinic. She is feeling better, but has persistent cough. She also reports fatigue.     We have collected two urine specimen's in past w/ microscopic hematuria without evidence of infection. She has been staying hydrated at home. Repeat dipstick ua today once again shows trace hematuria, everything else negative. Persistent hematuria has never been investigated. No complaints of flank pain at present time.     She accidentally missed her mammogram. She will be provided with number to our central scheduling to reschedule.     Review of Systems   Constitutional:  Positive for fatigue. Negative for activity change, appetite change and fever.   HENT:  Negative for congestion and rhinorrhea.    Respiratory:  Positive for cough. Negative for chest tightness and shortness of breath.    Cardiovascular:  Negative for chest pain and palpitations.   Gastrointestinal:  Negative for abdominal pain, nausea and vomiting.   Musculoskeletal:  Positive for arthralgias. Negative for joint swelling and myalgias.   Skin:  Negative for color change and rash.   Neurological:  Negative for dizziness, weakness, light-headedness and headaches.   Hematological:  Negative for adenopathy.    Psychiatric/Behavioral:  Negative for dysphoric mood and sleep disturbance. The patient is nervous/anxious.          Past Medical History:  Past Medical History:   Diagnosis Date    Anxiety     Cervical cancer     Generalized anxiety disorder 10/25/2022    History of cervical cancer 1993    Pure hypercholesterolemia 10/26/2022    Uterine cancer       Past Surgical History:   Procedure Laterality Date    CARPAL TUNNEL RELEASE Right     COLONOSCOPY      ganglionectomy Right     Wrist    HYSTERECTOMY      SURGICAL REMOVAL OF LESION OF ORBIT Right     TRIGGER FINGER RELEASE Right     Middle Finger      Review of patient's allergies indicates:   Allergen Reactions    Codeine     Sulfa (sulfonamide antibiotics) Nausea And Vomiting      Current Outpatient Medications   Medication Sig Dispense Refill    ALPRAZolam (XANAX) 0.25 MG tablet Take by mouth 3 (three) times daily.      ALPRAZolam (XANAX) 1 MG tablet       HYDROcodone-acetaminophen (NORCO) 5-325 mg per tablet       QUEtiapine (SEROQUEL) 400 MG tablet Take 400 mg by mouth once daily.      venlafaxine (EFFEXOR-XR) 150 MG Cp24 Take 150 mg by mouth once daily.      brompheniramine-pseudoeph-DM (BROMFED DM) 2-30-10 mg/5 mL Syrp Take 10 mLs by mouth every 4 (four) hours as needed (cough/congestion). 240 mL 0    diclofenac sodium (VOLTAREN) 1 % Gel Apply 2 g topically 4 (four) times daily. 100 g 3     No current facility-administered medications for this visit.     Social History     Socioeconomic History    Marital status: Single   Tobacco Use    Smoking status: Every Day     Types: Cigarettes    Smokeless tobacco: Never   Substance and Sexual Activity    Alcohol use: Yes    Drug use: Never    Sexual activity: Not Currently      Family History   Problem Relation Age of Onset    Hypertension Mother     Bone cancer Maternal Grandfather     Hypertension Father     Melanoma Maternal Uncle     Lung cancer Maternal Uncle         Objective:      Physical Exam  Constitutional:        Appearance: She is well-developed.   HENT:      Head: Normocephalic and atraumatic.   Eyes:      General: No scleral icterus.     Conjunctiva/sclera: Conjunctivae normal.      Pupils: Pupils are equal, round, and reactive to light.   Neck:      Thyroid: No thyroid mass.      Trachea: Trachea normal.   Cardiovascular:      Rate and Rhythm: Normal rate and regular rhythm.      Heart sounds: Normal heart sounds.   Pulmonary:      Effort: Pulmonary effort is normal.      Breath sounds: Normal breath sounds.   Musculoskeletal:         General: Deformity (heberdon's nodes DIP joints both hands) present.      Cervical back: Normal range of motion and neck supple.   Skin:     Findings: Lesion (skin eval completed; she has a small lesion on dorsum Lt wrist that is slightly dry consistent with actinic keratosis. This will be monitored for change a future appt.) present.   Neurological:      Mental Status: She is alert and oriented to person, place, and time.   Psychiatric:         Mood and Affect: Mood normal.         Behavior: Behavior normal.       Assessment:     1. Cough, unspecified type    2. Arthritis of both hands Active   3. Need for prophylactic vaccination with Streptococcus pneumoniae (Pneumococcus) and Influenza vaccines    4. Asymptomatic microscopic hematuria      Plan:       PROBLEM LIST     Cough, unspecified type  -     brompheniramine-pseudoeph-DM (BROMFED DM) 2-30-10 mg/5 mL Syrp; Take 10 mLs by mouth every 4 (four) hours as needed (cough/congestion).  Dispense: 240 mL; Refill: 0    Arthritis of both hands  Comments:  start diclofenac gel as directed   Orders:  -     diclofenac sodium (VOLTAREN) 1 % Gel; Apply 2 g topically 4 (four) times daily.  Dispense: 100 g; Refill: 3    Need for prophylactic vaccination with Streptococcus pneumoniae (Pneumococcus) and Influenza vaccines  -     (In Office Administered) Pneumococcal Conjugate Vaccine (20 Valent) (IM)    Asymptomatic microscopic hematuria  -      POCT urine dipstick without microscope

## 2023-01-11 ENCOUNTER — DOCUMENTATION ONLY (OUTPATIENT)
Dept: GASTROENTEROLOGY | Facility: CLINIC | Age: 58
End: 2023-01-11
Payer: MEDICAID

## 2023-01-26 ENCOUNTER — OFFICE VISIT (OUTPATIENT)
Dept: ORTHOPEDICS | Facility: CLINIC | Age: 58
End: 2023-01-26
Payer: MEDICAID

## 2023-01-26 DIAGNOSIS — M67.431 GANGLION CYST OF DORSUM OF RIGHT WRIST: Primary | ICD-10-CM

## 2023-01-26 PROCEDURE — 1160F PR REVIEW ALL MEDS BY PRESCRIBER/CLIN PHARMACIST DOCUMENTED: ICD-10-PCS | Mod: CPTII,S$GLB,, | Performed by: ORTHOPAEDIC SURGERY

## 2023-01-26 PROCEDURE — 1159F MED LIST DOCD IN RCRD: CPT | Mod: CPTII,S$GLB,, | Performed by: ORTHOPAEDIC SURGERY

## 2023-01-26 PROCEDURE — 99024 PR POST-OP FOLLOW-UP VISIT: ICD-10-PCS | Mod: S$GLB,,, | Performed by: ORTHOPAEDIC SURGERY

## 2023-01-26 PROCEDURE — 1160F RVW MEDS BY RX/DR IN RCRD: CPT | Mod: CPTII,S$GLB,, | Performed by: ORTHOPAEDIC SURGERY

## 2023-01-26 PROCEDURE — 99024 POSTOP FOLLOW-UP VISIT: CPT | Mod: S$GLB,,, | Performed by: ORTHOPAEDIC SURGERY

## 2023-01-26 PROCEDURE — 1159F PR MEDICATION LIST DOCUMENTED IN MEDICAL RECORD: ICD-10-PCS | Mod: CPTII,S$GLB,, | Performed by: ORTHOPAEDIC SURGERY

## 2023-01-26 RX ORDER — METHYLPREDNISOLONE 4 MG/1
TABLET ORAL
Qty: 21 EACH | Refills: 0 | Status: SHIPPED | OUTPATIENT
Start: 2023-01-26 | End: 2023-02-16

## 2023-01-26 NOTE — PROGRESS NOTES
Subjective:      Patient ID: Elizabeth Gomez is a 57 y.o. female.    Chief Complaint: Post-op Evaluation of the Right Hand    HPI 57-year-old lady comes in for recheck on her right upper extremity.  She feels things are not right.    ROS    Unchanged from prior visit  Objective:      Her incisions are well healed.  She has had some recurrence of her wrist ganglion.  The trigger finger scar is tender and she lacks 2 cm of being able to touch the tip of her middle finger to her distal palmar crease.  The carpal tunnel incision is well healed.      Ortho/SPM Exam            Assessment:       Encounter Diagnosis   Name Primary?    Ganglion cyst of dorsum of right wrist Yes          Plan:       Elizabeth was seen today for post-op evaluation.    Diagnoses and all orders for this visit:    Ganglion cyst of dorsum of right wrist  -     X-Ray Wrist 2 View Right; Future    X-ray of the wrist is unremarkable.      She is placed on a Medrol Dosepak and arrangements were made for a course of therapy.  Return 2 weeks for recheck.  She remains off work at this time

## 2023-02-02 ENCOUNTER — TELEPHONE (OUTPATIENT)
Dept: ORTHOPEDICS | Facility: CLINIC | Age: 58
End: 2023-02-02
Payer: MEDICAID

## 2023-02-02 NOTE — TELEPHONE ENCOUNTER
2/2/233:30 pm  Spoke w/ patient    She has lost her work excuse. Would like it faxed to her.    Work excuse faxed

## 2023-02-02 NOTE — TELEPHONE ENCOUNTER
----- Message from Lisa Hou sent at 2/2/2023  2:58 PM CST -----  Contact: self  Type - Call Back Request     Patient would like to speak w/someone in clinic in regards to getting a work excuse and some other concerns, please reach out to her @ [phone] - thanks!

## 2023-02-07 ENCOUNTER — TELEPHONE (OUTPATIENT)
Dept: ORTHOPEDICS | Facility: CLINIC | Age: 58
End: 2023-02-07
Payer: MEDICAID

## 2023-02-07 NOTE — TELEPHONE ENCOUNTER
----- Message from Desiree Brown sent at 2/7/2023 12:22 PM CST -----  Contact: self  Pt wants to know if her physical therapy could include her wrist also, the trigger finger, and carpal tunnel pls call back [phone]  Pt alson states she needs to cancel upcoming appt due to not enough therapy to show results

## 2023-02-07 NOTE — TELEPHONE ENCOUNTER
2/7/23  12:20 pm  Spoke w/ patient    Requesting her wrist be added to the PT order. Also will push back her return visit because she just started PT today.    I will speak to Dr. Butler about PT order & appointment is rescheduled to 2/16/23.

## 2023-02-22 ENCOUNTER — OFFICE VISIT (OUTPATIENT)
Dept: UROLOGY | Facility: CLINIC | Age: 58
End: 2023-02-22
Payer: MEDICAID

## 2023-02-22 VITALS — SYSTOLIC BLOOD PRESSURE: 135 MMHG | DIASTOLIC BLOOD PRESSURE: 85 MMHG | HEART RATE: 93 BPM

## 2023-02-22 DIAGNOSIS — R31.21 ASYMPTOMATIC MICROSCOPIC HEMATURIA: Chronic | ICD-10-CM

## 2023-02-22 LAB — POC RESIDUAL URINE VOLUME: 0 ML (ref 0–100)

## 2023-02-22 PROCEDURE — 3075F SYST BP GE 130 - 139MM HG: CPT | Mod: CPTII,S$GLB,, | Performed by: NURSE PRACTITIONER

## 2023-02-22 PROCEDURE — 99204 OFFICE O/P NEW MOD 45 MIN: CPT | Mod: S$GLB,,, | Performed by: NURSE PRACTITIONER

## 2023-02-22 PROCEDURE — 3079F PR MOST RECENT DIASTOLIC BLOOD PRESSURE 80-89 MM HG: ICD-10-PCS | Mod: CPTII,S$GLB,, | Performed by: NURSE PRACTITIONER

## 2023-02-22 PROCEDURE — 51798 US URINE CAPACITY MEASURE: CPT | Mod: S$GLB,,, | Performed by: NURSE PRACTITIONER

## 2023-02-22 PROCEDURE — 3079F DIAST BP 80-89 MM HG: CPT | Mod: CPTII,S$GLB,, | Performed by: NURSE PRACTITIONER

## 2023-02-22 PROCEDURE — 1160F RVW MEDS BY RX/DR IN RCRD: CPT | Mod: CPTII,S$GLB,, | Performed by: NURSE PRACTITIONER

## 2023-02-22 PROCEDURE — 3075F PR MOST RECENT SYSTOLIC BLOOD PRESS GE 130-139MM HG: ICD-10-PCS | Mod: CPTII,S$GLB,, | Performed by: NURSE PRACTITIONER

## 2023-02-22 PROCEDURE — 1159F MED LIST DOCD IN RCRD: CPT | Mod: CPTII,S$GLB,, | Performed by: NURSE PRACTITIONER

## 2023-02-22 PROCEDURE — 99204 PR OFFICE/OUTPT VISIT, NEW, LEVL IV, 45-59 MIN: ICD-10-PCS | Mod: S$GLB,,, | Performed by: NURSE PRACTITIONER

## 2023-02-22 PROCEDURE — 51798 POCT BLADDER SCAN: ICD-10-PCS | Mod: S$GLB,,, | Performed by: NURSE PRACTITIONER

## 2023-02-22 PROCEDURE — 1160F PR REVIEW ALL MEDS BY PRESCRIBER/CLIN PHARMACIST DOCUMENTED: ICD-10-PCS | Mod: CPTII,S$GLB,, | Performed by: NURSE PRACTITIONER

## 2023-02-22 PROCEDURE — 1159F PR MEDICATION LIST DOCUMENTED IN MEDICAL RECORD: ICD-10-PCS | Mod: CPTII,S$GLB,, | Performed by: NURSE PRACTITIONER

## 2023-02-22 NOTE — PROGRESS NOTES
Subjective:       Patient ID: Elizabeth Gomez is a 57 y.o. female.    Chief Complaint: No chief complaint on file.      HPI: 57-year-old female new to the service seen today for complaint of micro hematuria as well as recurring UTIs.  She states her UTIs are not that frequent and or culture proven.  Main complaint as the microscopic hematuria.  She is a is tobacco use her to pharmacy cigarettes half a pack a day for greater than 30 years.  She is also worked in the local chemical plant industry.  She has had a uterine cervical cancer all treated by her gyn.  She states that she does not void often through the day.  She works outside and sweats heavily.  Denies dysuria, frequency, urgency.  No other urologic concerns.       Past Medical History:   Past Medical History:   Diagnosis Date    Anxiety     Cervical cancer     Generalized anxiety disorder 10/25/2022    History of cervical cancer 1993    Pure hypercholesterolemia 10/26/2022    Uterine cancer        Past Surgical Historical:   Past Surgical History:   Procedure Laterality Date    CARPAL TUNNEL RELEASE Right     COLONOSCOPY      ganglionectomy Right     Wrist    HAND SURGERY Right     HYSTERECTOMY      SURGICAL REMOVAL OF LESION OF ORBIT Right     TRIGGER FINGER RELEASE Right     Middle Finger        Medications:   Medication List with Changes/Refills   Current Medications    ALPRAZOLAM (XANAX) 0.25 MG TABLET    Take by mouth 3 (three) times daily.    ALPRAZOLAM (XANAX) 1 MG TABLET        DICLOFENAC SODIUM (VOLTAREN) 1 % GEL    Apply 2 g topically 4 (four) times daily.    HYDROCODONE-ACETAMINOPHEN (NORCO) 5-325 MG PER TABLET        QUETIAPINE (SEROQUEL) 400 MG TABLET    Take 400 mg by mouth once daily.    VENLAFAXINE (EFFEXOR-XR) 150 MG CP24    Take 150 mg by mouth once daily.        Past Social History:   Social History     Socioeconomic History    Marital status: Single   Tobacco Use    Smoking status: Every Day     Packs/day: 0.50     Types: Cigarettes     Smokeless tobacco: Never   Substance and Sexual Activity    Alcohol use: Yes     Comment: RARELY    Drug use: Never    Sexual activity: Not Currently       Allergies:   Review of patient's allergies indicates:   Allergen Reactions    Codeine     Sulfa (sulfonamide antibiotics) Nausea And Vomiting        Family History:   Family History   Problem Relation Age of Onset    Hypertension Mother     Bone cancer Maternal Grandfather     Hypertension Father     Melanoma Maternal Uncle     Lung cancer Maternal Uncle         Review of Systems:  Review of Systems   Constitutional:  Negative for activity change and appetite change.   HENT:  Negative for congestion and dental problem.    Respiratory:  Negative for chest tightness and shortness of breath.    Cardiovascular:  Negative for chest pain.   Gastrointestinal:  Negative for abdominal distention and abdominal pain.   Genitourinary:  Positive for hematuria. Negative for decreased urine volume, difficulty urinating, dyspareunia, dysuria, enuresis, flank pain, frequency, genital sores, pelvic pain and urgency.   Musculoskeletal:  Negative for back pain and neck pain.   Allergic/Immunologic: Negative for immunocompromised state.   Neurological:  Negative for dizziness.   Hematological:  Negative for adenopathy.   Psychiatric/Behavioral:  Negative for agitation, behavioral problems and confusion.      Physical Exam:  Physical Exam  Constitutional:       Appearance: She is well-developed.   HENT:      Head: Normocephalic and atraumatic.   Eyes:      General: No scleral icterus.  Pulmonary:      Effort: Pulmonary effort is normal.      Breath sounds: Normal breath sounds.   Abdominal:      General: There is no distension.      Palpations: Abdomen is soft.      Tenderness: There is no abdominal tenderness.   Genitourinary:     Exam position: Supine.      Labia:         Right: No rash, tenderness or lesion.         Left: No rash, tenderness or lesion.       Vagina: Normal.       Rectum: Normal.   Musculoskeletal:      Cervical back: Normal range of motion.   Skin:     General: Skin is warm and dry.   Neurological:      Mental Status: She is alert and oriented to person, place, and time.       Assessment/Plan:   Asymptomatic micro hematuria in a patient with increased risk factors.  Urinalysis 5-10 red cells microscopically otherwise negative.  Urine for cytology.  PVR 0 mL.  Schedule CT urogram as well as cystoscopy, orders placed  Problem List Items Addressed This Visit    None  Visit Diagnoses       Asymptomatic microscopic hematuria  (Chronic)  (Active)      this is 3rd UA with hematuria without evidence of inf. Only trace hematuria today; will ask urology to investigate considering her smoking hx    Relevant Orders    POCT Bladder Scan    POCT Urinalysis (w/Micro Option)

## 2023-02-23 ENCOUNTER — OFFICE VISIT (OUTPATIENT)
Dept: ORTHOPEDICS | Facility: CLINIC | Age: 58
End: 2023-02-23
Payer: MEDICAID

## 2023-02-23 DIAGNOSIS — M65.331 TRIGGER MIDDLE FINGER OF RIGHT HAND: ICD-10-CM

## 2023-02-23 DIAGNOSIS — G56.03 BILATERAL CARPAL TUNNEL SYNDROME: Primary | ICD-10-CM

## 2023-02-23 PROCEDURE — 1159F PR MEDICATION LIST DOCUMENTED IN MEDICAL RECORD: ICD-10-PCS | Mod: CPTII,S$GLB,, | Performed by: ORTHOPAEDIC SURGERY

## 2023-02-23 PROCEDURE — 1160F PR REVIEW ALL MEDS BY PRESCRIBER/CLIN PHARMACIST DOCUMENTED: ICD-10-PCS | Mod: CPTII,S$GLB,, | Performed by: ORTHOPAEDIC SURGERY

## 2023-02-23 PROCEDURE — 20526 THER INJECTION CARP TUNNEL: CPT | Mod: 79,LT,S$GLB, | Performed by: ORTHOPAEDIC SURGERY

## 2023-02-23 PROCEDURE — 1160F RVW MEDS BY RX/DR IN RCRD: CPT | Mod: CPTII,S$GLB,, | Performed by: ORTHOPAEDIC SURGERY

## 2023-02-23 PROCEDURE — 20526 CARPAL TUNNEL: ICD-10-PCS | Mod: 79,LT,S$GLB, | Performed by: ORTHOPAEDIC SURGERY

## 2023-02-23 PROCEDURE — 99024 PR POST-OP FOLLOW-UP VISIT: ICD-10-PCS | Mod: S$GLB,,, | Performed by: ORTHOPAEDIC SURGERY

## 2023-02-23 PROCEDURE — 99024 POSTOP FOLLOW-UP VISIT: CPT | Mod: S$GLB,,, | Performed by: ORTHOPAEDIC SURGERY

## 2023-02-23 PROCEDURE — 1159F MED LIST DOCD IN RCRD: CPT | Mod: CPTII,S$GLB,, | Performed by: ORTHOPAEDIC SURGERY

## 2023-02-23 NOTE — PROCEDURES
Carpal Tunnel    Date/Time: 2/23/2023 8:45 AM  Performed by: Dmitri Butler MD  Authorized by: Dmitri Butler MD     Consent Done?:  Yes (Verbal)  Prep: patient was prepped and draped in usual sterile fashion      Local anesthesia used?: No    Location:  Wrist  Site:  L carpal tunnel  Needle size:  25 G  Approach:  Volar  Medications:  5 mg triamcinolone acetonide 10 mg/mL  Patient tolerance:  Patient tolerated the procedure well with no immediate complications

## 2023-02-23 NOTE — PROGRESS NOTES
Subjective:      Patient ID: Elizabeth Gomez is a 57 y.o. female.    Chief Complaint: Post-op Evaluation of the Right Hand and Follow-up    HPI patient continues to complain of pain and stiffness in her right hand.  She was canceled from her occupational therapy at [facility] for violating their attendance policy.  Her chief complaint today is of left upper extremity pain and numbness.    ROS unchanged from prior visit      Objective:      Patient has decreased sensation in the median nerve distribution and a positive carpal compression test on the left.      Ortho/SPM Exam            Assessment:       Encounter Diagnoses   Name Primary?    Bilateral carpal tunnel syndrome Yes    Trigger middle finger of right hand           Plan:       Elizabeth was seen today for follow-up and post-op evaluation.    Diagnoses and all orders for this visit:    Bilateral carpal tunnel syndrome    Trigger middle finger of right hand    I really have nothing else to offer with respect to her right hand.  The left carpal tunnel is injected today.  Return p.r.n.

## 2023-03-06 LAB
BUN SERPL-MCNC: 21 MG/DL (ref 7–18)
CREAT SERPL-MCNC: 0.94 MG/DL (ref 0.55–1.02)
GFR ESTIMATION: > 60

## 2023-03-10 ENCOUNTER — TELEPHONE (OUTPATIENT)
Dept: GASTROENTEROLOGY | Facility: CLINIC | Age: 58
End: 2023-03-10
Payer: MEDICAID

## 2023-03-10 NOTE — TELEPHONE ENCOUNTER
----- Message from Petty Han LPN sent at 3/9/2023 12:20 PM CST -----  Regarding: Bright red blood in stool  Contact: pt  I spoke to patient, she states the last 2 times she had a bowel movement she had blood in her stools.  She states not a lot but enough to notice. She has had constipation.  States 2 weeks ago had food poisoning but is better now.  Also has had blood in her urine and sees Dr Clement Platt  for this.  Had a bladder scan done yesterday.  ----- Message -----  From: Leticia Macias  Sent: 3/9/2023  12:06 PM CST  To: [facility]UN[facility] Staff    Calling about blood in stool and she can be reached at [phone]     Daisy

## 2023-03-13 ENCOUNTER — TELEPHONE (OUTPATIENT)
Dept: UROLOGY | Facility: CLINIC | Age: 58
End: 2023-03-13
Payer: MEDICAID

## 2023-03-13 NOTE — TELEPHONE ENCOUNTER
----- Message from Melany Hernadez sent at 3/13/2023  2:35 PM CDT -----  Contact: Elizabeth  Elizabeth needs a call back at [phone], Regards to her Ct scan results.    Thanks  Td

## 2023-03-15 ENCOUNTER — TELEPHONE (OUTPATIENT)
Dept: UROLOGY | Facility: CLINIC | Age: 58
End: 2023-03-15
Payer: MEDICAID

## 2023-03-17 ENCOUNTER — TELEPHONE (OUTPATIENT)
Dept: UROLOGY | Facility: CLINIC | Age: 58
End: 2023-03-17
Payer: MEDICAID

## 2023-03-17 DIAGNOSIS — R91.1 LEFT LOWER LOBE PULMONARY NODULE: Primary | ICD-10-CM

## 2023-03-17 NOTE — TELEPHONE ENCOUNTER
03-17-23     Notified PCP (Nika Orellana NP)/copy of CT given, in person-PCP states will order f/u chest ct.      @16:11 - pt notified and verbalized understanding.

## 2023-03-17 NOTE — TELEPHONE ENCOUNTER
"CT Abdomen/Pelvis WO/W contrast 03-08-23    Per Clement Platt NP:   "Fwd to PCP.  Needs f/u CT Chest s/t pulmonary nodule.  Ensure they have received."   "

## 2023-03-17 NOTE — PROGRESS NOTES
1.3 x 1.4 cm LLL pulm nodule; Incidental finding on abd/pelvic CT; long-term smoker; I spoke with Ms Johnson and let her know that I am ordering CT chest for closer visualization of nodule(s). She is aware that she will be contacted next week by central scheduling.

## 2023-03-23 ENCOUNTER — TELEPHONE (OUTPATIENT)
Dept: FAMILY MEDICINE | Facility: CLINIC | Age: 58
End: 2023-03-23
Payer: MEDICAID

## 2023-03-23 NOTE — TELEPHONE ENCOUNTER
----- Message from Amarilis Tyler sent at 3/23/2023 12:47 PM CDT -----  Contact: Patient  Patient called to consult with nurse or staff regarding a ct scan. She states they found a nodule and wanted to speak with office regarding this. She would like a call back and can be reached at [phone]. Thanks/MR

## 2023-03-28 ENCOUNTER — PROCEDURE VISIT (OUTPATIENT)
Dept: UROLOGY | Facility: CLINIC | Age: 58
End: 2023-03-28
Payer: MEDICAID

## 2023-03-28 VITALS
WEIGHT: 169.5 LBS | DIASTOLIC BLOOD PRESSURE: 90 MMHG | HEART RATE: 100 BPM | OXYGEN SATURATION: 98 % | RESPIRATION RATE: 20 BRPM | BODY MASS INDEX: 25.03 KG/M2 | SYSTOLIC BLOOD PRESSURE: 149 MMHG

## 2023-03-28 DIAGNOSIS — R31.29 HEMATURIA, MICROSCOPIC: Primary | ICD-10-CM

## 2023-03-28 PROCEDURE — 52000 CYSTOSCOPY: ICD-10-PCS | Mod: S$GLB,,, | Performed by: UROLOGY

## 2023-03-28 PROCEDURE — 52000 CYSTOURETHROSCOPY: CPT | Mod: S$GLB,,, | Performed by: UROLOGY

## 2023-03-28 NOTE — PROCEDURES
Cystoscopy    Date/Time: 3/28/2023 10:30 AM  Performed by: Timmy Milner MD  Authorized by: Timmy Milner MD     Consent Done?:  Yes (Written)  Timeout: prior to procedure the correct patient, procedure, and site was verified    Prep: patient was prepped and draped in usual sterile fashion    Anesthesia:  Intraurethral instillation  Indications: hematuria    Position:  Supine  Anesthesia:  Intraurethral instillation  Patient sedated?: No    Preparation: Patient was prepped and draped in usual sterile fashion    Scope type:  Flexible cystoscope  External exam normal: Yes    Urethra normal: Yes    Comments:      The patient was brought to the procedure room placed on the table padded prepped and draped in usual sterile fashion in supine position. The cystoscope was inserted into the urethra and advanced the urethra was normal. The bladder was entered and inspected, it was found to be free of tumor stone or foreign body.  Bilateral ureteral orifices were identified and noted to be normal in appearance with clear efflux of urine at this point the scope was removed the patient tolerated the procedure well there were no complications.  Pelvic exam was performed and no abnormalities were noted.

## 2023-03-28 NOTE — PATIENT INSTRUCTIONS
Patient Education       Cystoscopy Discharge Instructions   About this topic   Your kidneys make urine. It is stored in your bladder. The urethra is a tube at the bottom of the bladder. Urine flows out of this tube. Sometimes, there is a blockage and urine is not able to leave the body.  A cystoscopy is a procedure that lets the doctor see the inside of your bladder and urethra. The doctor does it to:  Look for stones or tumors blocking the bladder and urethra  Look for changes or injury inside the bladder  Take a tissue sample from the inside of your bladder  Look for reasons for blood in the urine, pain with urination, or why you are passing urine often  Look for prostate problems     What care is needed at home?   Ask your doctor what you need to do when you go home. Make sure you ask questions if you do not understand what the doctor says. This way you will know what you need to do.  Take a warm bath or use a warm wet washcloth over the opening to the urethra. This will help to ease any pain. Do this as needed.  Drink 6 to 8 glasses of water a day and 3 to 4 glasses in the first few hours after the procedure to flush out your bladder and reduce irritation.  You may see some blood in your urine for a few days. This is normal.  Empty your bladder as soon as you feel the need to. Don't delay going to the bathroom. It stretches and weakens the bladder.  What follow-up care is needed?   Your doctor may ask you to make visits to the office to check on your progress. Be sure to keep these visits.  If you had a biopsy, talk with your doctor about the results.  What drugs may be needed?   The doctor may order drugs to:  Help with pain  Fight an infection  Help with bladder spasms  Will physical activity be limited?   Talk to your doctor about when you may go back to your normal activities like work, driving, or sex.  What problems could happen?   Bleeding  Infection  Injury to the bladder and urethra  Discomfort in the  urethra area  Burning sensation for a short time  Upset stomach  When do I need to call the doctor?   Signs of infection. These include a fever of 100.4°F (38°C) or higher, chills, pain with passing urine.  Pain that does not go away even with drugs or that lasts longer than 2 days  Too much blood in your urine  Passing large dime-sized clots  Cloudy urine  Little or no urine or not able to pass urine  Abdominal pain and nausea  Teach Back: Helping You Understand   The Teach Back Method helps you understand the information we are giving you. After you talk with the staff, tell them in your own words what you learned. This helps to make sure the staff has described each thing clearly. It also helps to explain things that may have been confusing. Before going home, make sure you can do these:  I can tell you about my procedure.  I can tell you what may help ease my pain.  I can tell you what I will do if I have a fever, chills, or am not able to pass urine.  Where can I learn more?   American Cancer Society  https://www.cancer.org/treatment/understanding-your-diagnosis/tests/endoscopy/cystoscopy.html   [employer]  https://www.cancerresearchuk.org/about-cancer/bladder-cancer/getting-diagnosed/tests-diagnose/cystoscopy   NHS Choices  http://www.nhs.uk/conditions/Cystoscopy/Pages/Introduction.aspx   Last Reviewed Date   2021-04-22  Consumer Information Use and Disclaimer   This information is not specific medical advice and does not replace information you receive from your health care provider. This is only a brief summary of general information. It does NOT include all information about conditions, illnesses, injuries, tests, procedures, treatments, therapies, discharge instructions or life-style choices that may apply to you. You must talk with your health care provider for complete information about your health and treatment options. This information should not be used to decide whether or not to accept your  health care providers advice, instructions or recommendations. Only your health care provider has the knowledge and training to provide advice that is right for you.  Copyright   Copyright © 2021 [employer] and its affiliates and/or licensors. All rights reserved.

## 2023-04-12 ENCOUNTER — OFFICE VISIT (OUTPATIENT)
Dept: FAMILY MEDICINE | Facility: CLINIC | Age: 58
End: 2023-04-12
Payer: MEDICAID

## 2023-04-12 VITALS
DIASTOLIC BLOOD PRESSURE: 76 MMHG | OXYGEN SATURATION: 98 % | RESPIRATION RATE: 16 BRPM | HEART RATE: 97 BPM | HEIGHT: 69 IN | BODY MASS INDEX: 25.03 KG/M2 | WEIGHT: 169 LBS | SYSTOLIC BLOOD PRESSURE: 110 MMHG | TEMPERATURE: 99 F

## 2023-04-12 DIAGNOSIS — L70.0 CYSTIC ACNE: ICD-10-CM

## 2023-04-12 DIAGNOSIS — F17.210 CIGARETTE NICOTINE DEPENDENCE WITHOUT COMPLICATION: ICD-10-CM

## 2023-04-12 DIAGNOSIS — R91.1 LEFT LOWER LOBE PULMONARY NODULE: Primary | ICD-10-CM

## 2023-04-12 DIAGNOSIS — R05.3 CHRONIC COUGH: ICD-10-CM

## 2023-04-12 DIAGNOSIS — J43.2 CENTRILOBULAR EMPHYSEMA: ICD-10-CM

## 2023-04-12 PROCEDURE — 3078F DIAST BP <80 MM HG: CPT | Mod: CPTII,S$GLB,, | Performed by: NURSE PRACTITIONER

## 2023-04-12 PROCEDURE — 3008F BODY MASS INDEX DOCD: CPT | Mod: CPTII,S$GLB,, | Performed by: NURSE PRACTITIONER

## 2023-04-12 PROCEDURE — 1160F RVW MEDS BY RX/DR IN RCRD: CPT | Mod: CPTII,S$GLB,, | Performed by: NURSE PRACTITIONER

## 2023-04-12 PROCEDURE — 99214 OFFICE O/P EST MOD 30 MIN: CPT | Mod: S$GLB,,, | Performed by: NURSE PRACTITIONER

## 2023-04-12 PROCEDURE — 1159F PR MEDICATION LIST DOCUMENTED IN MEDICAL RECORD: ICD-10-PCS | Mod: CPTII,S$GLB,, | Performed by: NURSE PRACTITIONER

## 2023-04-12 PROCEDURE — 1160F PR REVIEW ALL MEDS BY PRESCRIBER/CLIN PHARMACIST DOCUMENTED: ICD-10-PCS | Mod: CPTII,S$GLB,, | Performed by: NURSE PRACTITIONER

## 2023-04-12 PROCEDURE — 99214 PR OFFICE/OUTPT VISIT, EST, LEVL IV, 30-39 MIN: ICD-10-PCS | Mod: S$GLB,,, | Performed by: NURSE PRACTITIONER

## 2023-04-12 PROCEDURE — 1159F MED LIST DOCD IN RCRD: CPT | Mod: CPTII,S$GLB,, | Performed by: NURSE PRACTITIONER

## 2023-04-12 PROCEDURE — 3074F SYST BP LT 130 MM HG: CPT | Mod: CPTII,S$GLB,, | Performed by: NURSE PRACTITIONER

## 2023-04-12 PROCEDURE — 3008F PR BODY MASS INDEX (BMI) DOCUMENTED: ICD-10-PCS | Mod: CPTII,S$GLB,, | Performed by: NURSE PRACTITIONER

## 2023-04-12 PROCEDURE — 3078F PR MOST RECENT DIASTOLIC BLOOD PRESSURE < 80 MM HG: ICD-10-PCS | Mod: CPTII,S$GLB,, | Performed by: NURSE PRACTITIONER

## 2023-04-12 PROCEDURE — 3074F PR MOST RECENT SYSTOLIC BLOOD PRESSURE < 130 MM HG: ICD-10-PCS | Mod: CPTII,S$GLB,, | Performed by: NURSE PRACTITIONER

## 2023-04-12 RX ORDER — CLINDAMYCIN AND BENZOYL PEROXIDE 10; 50 MG/G; MG/G
GEL TOPICAL 2 TIMES DAILY
Qty: 50 G | Refills: 1 | Status: SHIPPED | OUTPATIENT
Start: 2023-04-12 | End: 2023-05-11

## 2023-04-12 NOTE — PROGRESS NOTES
Subjective:       Patient ID: Elizabeth Gomez is a 57 y.o. female.    Chief Complaint: completed CT chest this afternoon, worsening acne      1.3 x 1.4 cm LLL pulm nodule; Incidental finding on abd/pelvic CT during urology evaluation; long-term smoker; I spoke with Ms Johnson on March 17th and let her know that ordered CT chest for closer visualization of nodule(s). She completed CT this afternoon at 1:00; results still pending; some occasional cough, no SOB    Ongoing problems w/ cystic acne forehead. Comedones scatter on forehead; sometimes will get inflamed. She has tendency to pick at lesions in attempt to drain;  She has tried topical products including Retinol and Differin 0.1%--but this has not helped; she is requesting prescription topical       Hyperlipidemia  Type of hyperlipidemia: combined  Duration: chronic  Control: usually well controlled; at goal  Compliance: compliant; compliant with diet; exercises  Complications: no coronary artery disease; no cerebral vascular disease, no peripheral artery disease  ASCVD:The 10-year ASCVD risk score (Luis YING, et al., 2019) is: 3.8%    Values used to calculate the score:      Age: 57 years      Sex: Female      Is Non- : No      Diabetic: No      Tobacco smoker: Yes      Systolic Blood Pressure: 110 mmHg      Is BP treated: No      HDL Cholesterol: 80 mg/dL      Total Cholesterol: 206 mg/dL       Review of Systems   Constitutional:  Positive for fatigue. Negative for activity change, appetite change and fever.   HENT:  Negative for congestion and rhinorrhea.    Respiratory:  Positive for cough. Negative for chest tightness and shortness of breath.    Cardiovascular:  Negative for chest pain and palpitations.   Gastrointestinal:  Negative for abdominal pain, nausea and vomiting.   Musculoskeletal:  Positive for arthralgias. Negative for joint swelling and myalgias.   Skin:  Positive for rash. Negative for color change.   Neurological:   Negative for dizziness, light-headedness and headaches.   Hematological:  Negative for adenopathy.   Psychiatric/Behavioral:  Negative for dysphoric mood and sleep disturbance. The patient is nervous/anxious.          Past Medical History:  Past Medical History:   Diagnosis Date    Anxiety     Cervical cancer     Generalized anxiety disorder 10/25/2022    History of cervical cancer 1993    Pure hypercholesterolemia 10/26/2022    Uterine cancer       Past Surgical History:   Procedure Laterality Date    CARPAL TUNNEL RELEASE Right     COLONOSCOPY      ganglionectomy Right     Wrist    HAND SURGERY Right     HYSTERECTOMY      SURGICAL REMOVAL OF LESION OF ORBIT Right     TRIGGER FINGER RELEASE Right     Middle Finger      Review of patient's allergies indicates:   Allergen Reactions    Codeine     Sulfa (sulfonamide antibiotics) Nausea And Vomiting      Current Outpatient Medications   Medication Sig Dispense Refill    ALPRAZolam (XANAX) 1 MG tablet       clindamycin-benzoyl peroxide (BENZACLIN) gel Apply topically 2 (two) times daily. 50 g 1    diclofenac sodium (VOLTAREN) 1 % Gel Apply 2 g topically 4 (four) times daily. 100 g 3    QUEtiapine (SEROQUEL) 400 MG tablet Take 400 mg by mouth once daily.      venlafaxine (EFFEXOR-XR) 150 MG Cp24 Take 150 mg by mouth once daily.       No current facility-administered medications for this visit.     Social History     Socioeconomic History    Marital status: Single   Tobacco Use    Smoking status: Every Day     Packs/day: 0.50     Types: Cigarettes    Smokeless tobacco: Never   Substance and Sexual Activity    Alcohol use: Yes     Comment: RARELY    Drug use: Never    Sexual activity: Not Currently      Family History   Problem Relation Age of Onset    Hypertension Mother     Bone cancer Maternal Grandfather     Hypertension Father     Melanoma Maternal Uncle     Lung cancer Maternal Uncle         Objective:      Physical Exam  Constitutional:       Appearance: She is  well-developed.   HENT:      Head: Normocephalic and atraumatic.   Eyes:      General: No scleral icterus.     Conjunctiva/sclera: Conjunctivae normal.      Pupils: Pupils are equal, round, and reactive to light.   Neck:      Thyroid: No thyroid mass.      Trachea: Trachea normal.   Pulmonary:      Effort: Pulmonary effort is normal.      Breath sounds: Normal breath sounds.   Musculoskeletal:      Cervical back: Normal range of motion and neck supple.   Skin:     Findings: Rash (diffuse cystic  comedones scattered forehead) present.   Neurological:      Mental Status: She is alert and oriented to person, place, and time.   Psychiatric:         Mood and Affect: Mood normal.         Behavior: Behavior normal.       Assessment:     1. Left lower lobe pulmonary nodule    2. Cigarette nicotine dependence without complication    3. Chronic cough    4. Centrilobular emphysema    5. Cystic acne      Plan:       PROBLEM LIST     Left lower lobe pulmonary nodule  -     CT Chest Without Contrast; Future; Expected date: 07/13/2023    Cigarette nicotine dependence without complication  -     CT Chest Without Contrast; Future; Expected date: 07/13/2023    Chronic cough  -     Complete PFT w/ bronchodilator; Future  -     CT Chest Without Contrast; Future; Expected date: 07/13/2023    Centrilobular emphysema  -     Complete PFT w/ bronchodilator; Future    Cystic acne  -     clindamycin-benzoyl peroxide (BENZACLIN) gel; Apply topically 2 (two) times daily.  Dispense: 50 g; Refill: 1        Reviewed CT results--LLL nodule with tiny calcification. This may represent a partially calcified granuloma. Fleischner Society criteria recommends a 3 month noncontrast chest CT or PET CT follow-up; additionally, Mild upper lobe paraseptal and centrilobular emphysematous changes; mild scattered atherosclerotic calcifications.     Arranging f/u imaging according to recommendation; Results will dictate future clinical course    Arranging PFTs to  eval severity of emphysema and start treatment course; will continue to educate regarding smoking cessation in view present CT findings    ASCVD score 3.8%--but there is scattered atherosclerotic calcifications present on chest imaging; recommend mod intensity statin, smoking cessation, and dietary changes.     For cystic acne--ordering Benzaclin gel; make sure to wear sunscreen daily, wear hat when outdoors, and reapply sunscreen often when outdoors.

## 2023-04-13 ENCOUNTER — TELEPHONE (OUTPATIENT)
Dept: HEMATOLOGY/ONCOLOGY | Facility: CLINIC | Age: 58
End: 2023-04-13
Payer: MEDICAID

## 2023-04-13 NOTE — TELEPHONE ENCOUNTER
----- Message from Amarilis Tyler sent at 4/13/2023  3:10 PM CDT -----  Contact: Patient  Patient called to consult with nurse or staff regarding a missed call. She would like a call back and can be reached at [phone]. Thanks/

## 2023-04-13 NOTE — TELEPHONE ENCOUNTER
Return call spoke with patient have her scheduled for PFT. LB  ----- Message from Amarilis Tyler sent at 4/13/2023  3:10 PM CDT -----  Contact: Patient  Patient called to consult with nurse or staff regarding a missed call. She would like a call back and can be reached at [phone]. Thanks/MR

## 2023-04-19 ENCOUNTER — TELEPHONE (OUTPATIENT)
Dept: FAMILY MEDICINE | Facility: CLINIC | Age: 58
End: 2023-04-19
Payer: MEDICAID

## 2023-04-19 NOTE — TELEPHONE ENCOUNTER
----- Message from Mimi Cantu sent at 4/19/2023 11:03 AM CDT -----  Patient is requesting a call back  in reference to would like to be seen after pulmonary function test at [phone].

## 2023-04-20 ENCOUNTER — CLINICAL SUPPORT (OUTPATIENT)
Dept: PULMONOLOGY | Facility: CLINIC | Age: 58
End: 2023-04-20
Payer: MEDICAID

## 2023-04-20 DIAGNOSIS — J43.2 CENTRILOBULAR EMPHYSEMA: ICD-10-CM

## 2023-04-20 DIAGNOSIS — R05.3 CHRONIC COUGH: ICD-10-CM

## 2023-04-20 PROCEDURE — 94060 EVALUATION OF WHEEZING: CPT | Mod: S$GLB,,, | Performed by: INTERNAL MEDICINE

## 2023-04-20 PROCEDURE — 94726 PLETHYSMOGRAPHY LUNG VOLUMES: CPT | Mod: S$GLB,,, | Performed by: INTERNAL MEDICINE

## 2023-04-20 PROCEDURE — 94726 PULM FUNCT TST PLETHYSMOGRAP: ICD-10-PCS | Mod: S$GLB,,, | Performed by: INTERNAL MEDICINE

## 2023-04-20 PROCEDURE — 94060 PR EVAL OF BRONCHOSPASM: ICD-10-PCS | Mod: S$GLB,,, | Performed by: INTERNAL MEDICINE

## 2023-04-20 PROCEDURE — 94729 DIFFUSING CAPACITY: CPT | Mod: S$GLB,,, | Performed by: INTERNAL MEDICINE

## 2023-04-20 PROCEDURE — 94729 PR C02/MEMBANE DIFFUSE CAPACITY: ICD-10-PCS | Mod: S$GLB,,, | Performed by: INTERNAL MEDICINE

## 2023-04-25 ENCOUNTER — TELEPHONE (OUTPATIENT)
Dept: FAMILY MEDICINE | Facility: CLINIC | Age: 58
End: 2023-04-25
Payer: MEDICAID

## 2023-04-25 NOTE — TELEPHONE ENCOUNTER
----- Message from Melany Hernadez sent at 4/25/2023  1:23 PM CDT -----  Contact: Elizabeth Johnson needs a call back at [phone], Regards to her results of the pulmonary test.    Thanks  Td

## 2023-05-11 ENCOUNTER — OFFICE VISIT (OUTPATIENT)
Dept: GASTROENTEROLOGY | Facility: CLINIC | Age: 58
End: 2023-05-11
Payer: MEDICAID

## 2023-05-11 VITALS
HEART RATE: 103 BPM | WEIGHT: 169 LBS | BODY MASS INDEX: 25.03 KG/M2 | HEIGHT: 69 IN | SYSTOLIC BLOOD PRESSURE: 113 MMHG | DIASTOLIC BLOOD PRESSURE: 79 MMHG

## 2023-05-11 DIAGNOSIS — K62.5 BRBPR (BRIGHT RED BLOOD PER RECTUM): ICD-10-CM

## 2023-05-11 DIAGNOSIS — K58.1 IRRITABLE BOWEL SYNDROME WITH CONSTIPATION: Primary | ICD-10-CM

## 2023-05-11 DIAGNOSIS — Z86.010 HISTORY OF COLON POLYPS: ICD-10-CM

## 2023-05-11 PROCEDURE — 99214 PR OFFICE/OUTPT VISIT, EST, LEVL IV, 30-39 MIN: ICD-10-PCS | Mod: S$GLB,,,

## 2023-05-11 PROCEDURE — 3078F PR MOST RECENT DIASTOLIC BLOOD PRESSURE < 80 MM HG: ICD-10-PCS | Mod: CPTII,S$GLB,,

## 2023-05-11 PROCEDURE — 99214 OFFICE O/P EST MOD 30 MIN: CPT | Mod: S$GLB,,,

## 2023-05-11 PROCEDURE — 1159F PR MEDICATION LIST DOCUMENTED IN MEDICAL RECORD: ICD-10-PCS | Mod: CPTII,S$GLB,,

## 2023-05-11 PROCEDURE — 3008F BODY MASS INDEX DOCD: CPT | Mod: CPTII,S$GLB,,

## 2023-05-11 PROCEDURE — 1159F MED LIST DOCD IN RCRD: CPT | Mod: CPTII,S$GLB,,

## 2023-05-11 PROCEDURE — 3074F PR MOST RECENT SYSTOLIC BLOOD PRESSURE < 130 MM HG: ICD-10-PCS | Mod: CPTII,S$GLB,,

## 2023-05-11 PROCEDURE — 1160F RVW MEDS BY RX/DR IN RCRD: CPT | Mod: CPTII,S$GLB,,

## 2023-05-11 PROCEDURE — 3074F SYST BP LT 130 MM HG: CPT | Mod: CPTII,S$GLB,,

## 2023-05-11 PROCEDURE — 3078F DIAST BP <80 MM HG: CPT | Mod: CPTII,S$GLB,,

## 2023-05-11 PROCEDURE — 1160F PR REVIEW ALL MEDS BY PRESCRIBER/CLIN PHARMACIST DOCUMENTED: ICD-10-PCS | Mod: CPTII,S$GLB,,

## 2023-05-11 PROCEDURE — 3008F PR BODY MASS INDEX (BMI) DOCUMENTED: ICD-10-PCS | Mod: CPTII,S$GLB,,

## 2023-05-11 RX ORDER — CARIPRAZINE 1.5 MG/1
CAPSULE, GELATIN COATED ORAL
COMMUNITY
Start: 2023-04-19 | End: 2023-06-27

## 2023-05-11 NOTE — PROGRESS NOTES
Clinic Note    Reason for visit:  The primary encounter diagnosis was Irritable bowel syndrome with constipation. Diagnoses of BRBPR (bright red blood per rectum) and History of colon polyps were also pertinent to this visit.    PCP: Nika Orellana       HPI:  This is a 57 y.o. female who is established with Dr. Palm. Patient has h/o IBS-C. Has BM about three times a week. Was taking stool softeners but ran out. Was put on Linzess 290 mcg and took it daily and no BM for 4 days then once had BM it was diarrhea. Had 2 episodes of blood in stool in 3/2023 but was having more straining at that time and also had food poisoning before that. No blood since then.       Colonoscopy 5/4/2022: Prep (2 days of CLD and 8 L of golytely), long redundant colon, 3 TA, repeat colon w/adult colonoscope and agg prep (2d CLD, 8L GoLytely) in 3 years.    Colonoscopy 5/3/2022:  Solid stool in the rectum and distal sigmoid limiting advancement of endoscope. IH. Begin Linzess 290 mcg by mouth daily.  We will focus on daily soft easy bowel movements and then we will attempt a repeat colonoscopy with aggressive prep.     Colonoscopy 2/1/2022: stool throughout left colon, unable to visualize for advancement. Repeat colonoscopy with agg prep    Review of Systems   Constitutional:  Positive for diaphoresis and fatigue. Negative for fever and unexpected weight change.   HENT:  Negative for mouth sores, postnasal drip, sore throat and trouble swallowing.    Eyes:  Negative for pain, discharge and eye dryness.   Respiratory:  Positive for cough, chest tightness, shortness of breath and wheezing. Negative for apnea and choking.    Cardiovascular:  Positive for palpitations. Negative for chest pain and leg swelling.   Gastrointestinal:  Positive for blood in stool and constipation. Negative for abdominal distention, abdominal pain, anal bleeding, change in bowel habit, diarrhea, nausea, rectal pain, vomiting, reflux, fecal incontinence and change  in bowel habit.   Genitourinary:  Positive for hematuria. Negative for bladder incontinence and dysuria.   Musculoskeletal:  Positive for back pain and joint swelling. Negative for arthralgias.   Integumentary:  Negative for color change and rash.   Allergic/Immunologic: Negative for environmental allergies and food allergies.   Neurological:  Negative for seizures and headaches.   Hematological:  Negative for adenopathy. Bruises/bleeds easily.      Past Medical History:   Diagnosis Date    Anxiety     Cervical cancer     Generalized anxiety disorder 10/25/2022    History of cervical cancer 1993    Pure hypercholesterolemia 10/26/2022    Uterine cancer      Past Surgical History:   Procedure Laterality Date    CARPAL TUNNEL RELEASE Right     COLONOSCOPY      ganglionectomy Right     Wrist    HAND SURGERY Right     HYSTERECTOMY      SURGICAL REMOVAL OF LESION OF ORBIT Right     TRIGGER FINGER RELEASE Right     Middle Finger     Family History   Problem Relation Age of Onset    Hypertension Mother     Bone cancer Maternal Grandfather     Hypertension Father     Melanoma Maternal Uncle     Lung cancer Maternal Uncle      Social History     Tobacco Use    Smoking status: Every Day     Packs/day: 0.50     Types: Cigarettes    Smokeless tobacco: Never   Substance Use Topics    Alcohol use: Yes     Comment: RARELY    Drug use: Never     Review of patient's allergies indicates:   Allergen Reactions    Clarithromycin     Codeine     Sulfa (sulfonamide antibiotics) Nausea And Vomiting      Medication List with Changes/Refills   Current Medications    ALPRAZOLAM (XANAX) 1 MG TABLET        DICLOFENAC SODIUM (VOLTAREN) 1 % GEL    Apply 2 g topically 4 (four) times daily.    QUETIAPINE (SEROQUEL) 400 MG TABLET    Take 400 mg by mouth once daily.    VENLAFAXINE (EFFEXOR-XR) 150 MG CP24    Take 150 mg by mouth once daily.    VRAYLAR 1.5 MG CAP       Discontinued Medications    CLINDAMYCIN-BENZOYL PEROXIDE (BENZACLIN) GEL    Apply  "topically 2 (two) times daily.         Vital Signs:  /79   Pulse 103   Ht 5' 9" (1.753 m)   Wt 76.7 kg (169 lb)   BMI 24.96 kg/m²        Physical Exam  Vitals reviewed.   Constitutional:       General: She is awake. She is not in acute distress.     Appearance: Normal appearance. She is well-developed. She is not ill-appearing, toxic-appearing or diaphoretic.   HENT:      Head: Normocephalic and atraumatic.      Nose: Nose normal.      Mouth/Throat:      Mouth: Mucous membranes are moist.      Pharynx: Oropharynx is clear. No oropharyngeal exudate or posterior oropharyngeal erythema.   Eyes:      General: Lids are normal. Gaze aligned appropriately. No scleral icterus.        Right eye: No discharge.         Left eye: No discharge.      Conjunctiva/sclera: Conjunctivae normal.   Neck:      Trachea: Trachea normal.   Cardiovascular:      Rate and Rhythm: Normal rate and regular rhythm.      Pulses:           Radial pulses are 2+ on the right side and 2+ on the left side.   Pulmonary:      Effort: Pulmonary effort is normal. No respiratory distress.      Breath sounds: No stridor. No wheezing.   Chest:      Chest wall: No tenderness.   Abdominal:      General: Bowel sounds are normal. There is no distension.      Palpations: Abdomen is soft. There is no fluid wave, hepatomegaly or mass.      Tenderness: There is no abdominal tenderness. There is no guarding or rebound.   Musculoskeletal:         General: No tenderness or deformity.      Cervical back: Full passive range of motion without pain and neck supple. No tenderness.      Right lower leg: No edema.      Left lower leg: No edema.   Lymphadenopathy:      Cervical: No cervical adenopathy.   Skin:     General: Skin is warm and dry.      Capillary Refill: Capillary refill takes less than 2 seconds.      Coloration: Skin is not cyanotic, jaundiced or pale.   Neurological:      General: No focal deficit present.      Mental Status: She is alert and oriented " to person, place, and time.      Motor: No tremor.   Psychiatric:         Attention and Perception: Attention normal.         Mood and Affect: Mood and affect normal.         Speech: Speech normal.         Behavior: Behavior normal. Behavior is cooperative.          All of the data above and below has been reviewed by myself and any further interpretations will be reflected in the assessment and plan.   The data includes review of external notes, and independent interpretation of lab results, procedures, x-rays, and imaging reports.      Assessment:  Irritable bowel syndrome with constipation    BRBPR (bright red blood per rectum)    History of colon polyps      IBS-C: feels she is doing well with stool softener daily. Ran out. Linzess did not work well.   Colonoscopy due 5/2025 with aggr prep and 2 days CLD  Blood in stool x 2 in 3/2023 but none since then. Had food poisoning at that time then was straining. If blood returns, will consider colonoscopy.      Recommendations:  Continue stool softener daily.   Notify our office if you have any blood in stool.     Risks, benefits, and alternatives of medical management, any associated procedures, and/or treatment discussed with the patient. Patient given opportunity to ask questions and voices understanding. Patient has elected to proceed with the recommended care modalities as discussed.    Follow up if symptoms worsen or fail to improve.    Order summary:  No orders of the defined types were placed in this encounter.       Instructed patient to notify my office if they have not been contacted within two weeks after any procedures, submitting any samples (biopsies, blood, stool, urine, etc.) or after any imaging (X-ray, CT, MRI, etc.).      Tierra Dunlap NP    This document may have been created using a voice recognition transcribing system. Incorrect words or phrases may have been missed during proofreading. Please interpret accordingly or contact me for  clarification.

## 2023-05-17 ENCOUNTER — PATIENT OUTREACH (OUTPATIENT)
Dept: ADMINISTRATIVE | Facility: HOSPITAL | Age: 58
End: 2023-05-17
Payer: MEDICAID

## 2023-05-26 ENCOUNTER — TELEPHONE (OUTPATIENT)
Dept: UROLOGY | Facility: CLINIC | Age: 58
End: 2023-05-26
Payer: MEDICAID

## 2023-05-26 NOTE — TELEPHONE ENCOUNTER
Attempted to notify patient that her CT of abdomen & pelvis with & without contrast has been authorized to be done at [facility]. I left a message informing patient of above information & the radiology scheduling phone number for her to call to get scheduled. Instructed to call with any questions or concerns.

## 2023-06-14 ENCOUNTER — TELEPHONE (OUTPATIENT)
Dept: UROLOGY | Facility: CLINIC | Age: 58
End: 2023-06-14
Payer: MEDICAID

## 2023-06-14 NOTE — TELEPHONE ENCOUNTER
----- Message from Shama Jules sent at 6/14/2023  2:27 PM CDT -----  Contact: pt  Type:  Test Results    Who Called: pt   Name of Test (Lab/Mammo/Etc):  CT W/ and W/out contrast   Date of Test:  last Friday - pt is insure of date   Ordering Provider:  Lc   Where the test was performed: [place]   Would the patient rather a call back or a response via [facility]? phone  Best Call Back Number:  [phone]  Additional Information:

## 2023-06-14 NOTE — TELEPHONE ENCOUNTER
----- Message from Kevin Knight MA sent at 6/14/2023  1:29 PM CDT -----  Pt called regarding recent CT results.  ----- Message -----  From: Mimi Cantu  Sent: 6/14/2023  12:10 PM CDT  To: Reyna Maher Staff    Patient is calling in regards would like a call back in regards to test result concern has possible UTI..Please call her back at [phone]

## 2023-06-14 NOTE — TELEPHONE ENCOUNTER
Advised awaiting provider to review imaging- concerned UTI: foul odor to urine and back pain. Advised to drop off UA.

## 2023-06-15 DIAGNOSIS — I70.1 RENAL ARTERY STENOSIS: Primary | ICD-10-CM

## 2023-06-16 ENCOUNTER — TELEPHONE (OUTPATIENT)
Dept: UROLOGY | Facility: CLINIC | Age: 58
End: 2023-06-16
Payer: MEDICAID

## 2023-06-16 NOTE — TELEPHONE ENCOUNTER
----- Message from Clement Platt NP sent at 6/13/2023  8:51 AM CDT -----  Please notify patient of abnormal test results.  Notify as read by radiologist no acute abnormalities although there are some chronic changes such as hepatic steatosis, left lower low pulmonary soft tissue nodule in a non specific subcutaneous nodule just under the skin of the inferior pelvis.  These need to be followed up by her primary care for to copy the CT to that provider.  Small bilateral renal cyst will follow-up with an ultrasound in 6 months to ensure stability

## 2023-06-19 ENCOUNTER — TELEPHONE (OUTPATIENT)
Dept: FAMILY MEDICINE | Facility: CLINIC | Age: 58
End: 2023-06-19
Payer: MEDICAID

## 2023-06-19 NOTE — TELEPHONE ENCOUNTER
----- Message from Amarilis Tyler sent at 6/16/2023  4:12 PM CDT -----  Contact: Patient  Patient called to consult with nurse or staff regarding her recent labs. She the urologist office told her to contact her primary care and patient would like a call back. She can be reached at [phone]. Thanks/

## 2023-06-27 ENCOUNTER — TELEPHONE (OUTPATIENT)
Dept: FAMILY MEDICINE | Facility: CLINIC | Age: 58
End: 2023-06-27

## 2023-06-27 ENCOUNTER — TELEPHONE (OUTPATIENT)
Dept: CARDIOTHORACIC SURGERY | Facility: CLINIC | Age: 58
End: 2023-06-27
Payer: MEDICAID

## 2023-06-27 ENCOUNTER — OFFICE VISIT (OUTPATIENT)
Dept: FAMILY MEDICINE | Facility: CLINIC | Age: 58
End: 2023-06-27
Payer: MEDICAID

## 2023-06-27 VITALS
TEMPERATURE: 98 F | BODY MASS INDEX: 25.48 KG/M2 | HEIGHT: 69 IN | OXYGEN SATURATION: 98 % | SYSTOLIC BLOOD PRESSURE: 114 MMHG | HEART RATE: 63 BPM | WEIGHT: 172 LBS | RESPIRATION RATE: 18 BRPM | DIASTOLIC BLOOD PRESSURE: 65 MMHG

## 2023-06-27 DIAGNOSIS — I70.1 RENAL ARTERY STENOSIS: Chronic | ICD-10-CM

## 2023-06-27 DIAGNOSIS — F17.210 CIGARETTE NICOTINE DEPENDENCE WITHOUT COMPLICATION: ICD-10-CM

## 2023-06-27 DIAGNOSIS — R30.0 DYSURIA: ICD-10-CM

## 2023-06-27 DIAGNOSIS — K76.0 HEPATIC STEATOSIS: Primary | ICD-10-CM

## 2023-06-27 DIAGNOSIS — R91.8 PULMONARY NODULES: Chronic | ICD-10-CM

## 2023-06-27 LAB
BILIRUB SERPL-MCNC: NORMAL MG/DL
BLOOD URINE, POC: NORMAL
CLARITY, POC UA: CLEAR
COLOR, POC UA: NORMAL
GLUCOSE UR QL STRIP: NEGATIVE
KETONES UR QL STRIP: NORMAL
LEUKOCYTE ESTERASE URINE, POC: NEGATIVE
NITRITE, POC UA: NEGATIVE
PH, POC UA: 5.5
PROTEIN, POC: NEGATIVE
SPECIFIC GRAVITY, POC UA: >1.03
UROBILINOGEN, POC UA: 0.2

## 2023-06-27 PROCEDURE — 1159F PR MEDICATION LIST DOCUMENTED IN MEDICAL RECORD: ICD-10-PCS | Mod: CPTII,S$GLB,, | Performed by: NURSE PRACTITIONER

## 2023-06-27 PROCEDURE — 3074F PR MOST RECENT SYSTOLIC BLOOD PRESSURE < 130 MM HG: ICD-10-PCS | Mod: CPTII,S$GLB,, | Performed by: NURSE PRACTITIONER

## 2023-06-27 PROCEDURE — 99213 PR OFFICE/OUTPT VISIT, EST, LEVL III, 20-29 MIN: ICD-10-PCS | Mod: 25,S$GLB,, | Performed by: NURSE PRACTITIONER

## 2023-06-27 PROCEDURE — 3008F PR BODY MASS INDEX (BMI) DOCUMENTED: ICD-10-PCS | Mod: CPTII,S$GLB,, | Performed by: NURSE PRACTITIONER

## 2023-06-27 PROCEDURE — 3074F SYST BP LT 130 MM HG: CPT | Mod: CPTII,S$GLB,, | Performed by: NURSE PRACTITIONER

## 2023-06-27 PROCEDURE — 3008F BODY MASS INDEX DOCD: CPT | Mod: CPTII,S$GLB,, | Performed by: NURSE PRACTITIONER

## 2023-06-27 PROCEDURE — 1160F RVW MEDS BY RX/DR IN RCRD: CPT | Mod: CPTII,S$GLB,, | Performed by: NURSE PRACTITIONER

## 2023-06-27 PROCEDURE — 1160F PR REVIEW ALL MEDS BY PRESCRIBER/CLIN PHARMACIST DOCUMENTED: ICD-10-PCS | Mod: CPTII,S$GLB,, | Performed by: NURSE PRACTITIONER

## 2023-06-27 PROCEDURE — 99213 OFFICE O/P EST LOW 20 MIN: CPT | Mod: 25,S$GLB,, | Performed by: NURSE PRACTITIONER

## 2023-06-27 PROCEDURE — 1159F MED LIST DOCD IN RCRD: CPT | Mod: CPTII,S$GLB,, | Performed by: NURSE PRACTITIONER

## 2023-06-27 PROCEDURE — 3078F DIAST BP <80 MM HG: CPT | Mod: CPTII,S$GLB,, | Performed by: NURSE PRACTITIONER

## 2023-06-27 PROCEDURE — 3078F PR MOST RECENT DIASTOLIC BLOOD PRESSURE < 80 MM HG: ICD-10-PCS | Mod: CPTII,S$GLB,, | Performed by: NURSE PRACTITIONER

## 2023-06-27 PROCEDURE — 81002 POCT URINE DIPSTICK WITHOUT MICROSCOPE: ICD-10-PCS | Mod: S$GLB,,, | Performed by: NURSE PRACTITIONER

## 2023-06-27 PROCEDURE — 81002 URINALYSIS NONAUTO W/O SCOPE: CPT | Mod: S$GLB,,, | Performed by: NURSE PRACTITIONER

## 2023-06-27 RX ORDER — BUPROPION HYDROCHLORIDE 150 MG/1
1 TABLET ORAL DAILY
COMMUNITY
Start: 2023-06-14

## 2023-06-27 NOTE — TELEPHONE ENCOUNTER
----- Message from Leticia Macias sent at 6/27/2023 11:34 AM CDT -----  Contact: pt  Pt returning a call and would like to speak w/nurse and she can be reached at [phone]    [facility],

## 2023-06-27 NOTE — PROGRESS NOTES
Subjective:       Patient ID: Elizabeth Gomez is a 57 y.o. female.    Chief Complaint: Follow-up (Pt is here to discuss recent imaging.)    She is here to discuss some incidental findings on her CT abd that was ordered by urology. Results included hepatic steatosis and renal artery stenosing. It also mentioned pulmonary nodules that were previously seen CT of chest. She has upcoming appt scheduled with Pulmonology Oct 2023. Referral has been sent to Dr Joyce to address renal artery stenosis. We discussed hepatic steatosis at length-- including lifestyle modifications needed to improve this. She is still smoking. She has been established with [facility].     Review of Systems   Constitutional:  Negative for chills and fever.   Respiratory:  Negative for cough and wheezing.    Cardiovascular:  Negative for chest pain and palpitations.   Gastrointestinal:  Negative for abdominal pain, nausea and vomiting.   Genitourinary:  Positive for dysuria. Negative for frequency and urgency.   Musculoskeletal:  Negative for arthralgias.   Neurological:  Negative for dizziness, light-headedness and headaches.         Past Medical History:  Past Medical History:   Diagnosis Date    Anxiety     Cervical cancer     Generalized anxiety disorder 10/25/2022    Hepatic steatosis 6/27/2023    History of cervical cancer 1993    Pure hypercholesterolemia 10/26/2022    Uterine cancer       Past Surgical History:   Procedure Laterality Date    CARPAL TUNNEL RELEASE Right     COLONOSCOPY      ganglionectomy Right     Wrist    HAND SURGERY Right     HYSTERECTOMY      SURGICAL REMOVAL OF LESION OF ORBIT Right     TRIGGER FINGER RELEASE Right     Middle Finger      Review of patient's allergies indicates:   Allergen Reactions    Clarithromycin     Codeine     Sulfa (sulfonamide antibiotics) Nausea And Vomiting      Current Outpatient Medications   Medication Sig Dispense Refill    ALPRAZolam (XANAX) 1 MG tablet       buPROPion  (WELLBUTRIN XL) 150 MG TB24 tablet Take 1 tablet by mouth Daily.      diclofenac sodium (VOLTAREN) 1 % Gel Apply 2 g topically 4 (four) times daily. 100 g 3    QUEtiapine (SEROQUEL) 400 MG tablet Take 400 mg by mouth once daily.      venlafaxine (EFFEXOR-XR) 150 MG Cp24 Take 150 mg by mouth once daily.       No current facility-administered medications for this visit.     Social History     Socioeconomic History    Marital status: Single   Tobacco Use    Smoking status: Every Day     Packs/day: 0.50     Types: Cigarettes    Smokeless tobacco: Never   Substance and Sexual Activity    Alcohol use: Yes     Comment: RARELY    Drug use: Never    Sexual activity: Not Currently      Family History   Problem Relation Age of Onset    Hypertension Mother     Bone cancer Maternal Grandfather     Hypertension Father     Melanoma Maternal Uncle     Lung cancer Maternal Uncle         Objective:      Physical Exam  Constitutional:       Appearance: Normal appearance.   Eyes:      General: No scleral icterus.     Pupils: Pupils are equal, round, and reactive to light.   Pulmonary:      Effort: Pulmonary effort is normal.   Neurological:      Mental Status: She is alert and oriented to person, place, and time.   Psychiatric:         Mood and Affect: Mood normal.         Behavior: Behavior normal.       Assessment:     1. Hepatic steatosis    2. Renal artery stenosis Active   3. Pulmonary nodules Active   4. Cigarette nicotine dependence without complication    5. Dysuria      Plan:       PROBLEM LIST     Hepatic steatosis    Renal artery stenosis  Comments:  referral sent to Dr Joyce    Pulmonary nodules  Comments:  surveillance w/ Pulm; next appt Oct 2023    Cigarette nicotine dependence without complication    Dysuria  -     POCT urine dipstick without microscope    Dipstick UA neg leuk, neg nitrites, trace blood      We spoke at length regarding lifestyle modification to improve hepatic steatosis including dietary changes,  exercise, and smoking cessation.     She lost her mobile phone shortly after Urology referral was sent to [facility]. Appt has not been scheduled yet. I provided her with their ph # so she can call and get this appt scheduled.     Reminded about upcoming appts including Urology and Pulmonology. Keep both appts. Surveillance of pulmonary nodules w/ Dr Philip.

## 2023-06-27 NOTE — TELEPHONE ENCOUNTER
Pt scheduled and notified  ----- Message from Sejal Jefferson sent at 6/27/2023 11:29 AM CDT -----  Requesting a call back regarding scheduling new pt appt. Please call back at [phone]

## 2023-07-10 ENCOUNTER — OFFICE VISIT (OUTPATIENT)
Dept: CARDIOTHORACIC SURGERY | Facility: CLINIC | Age: 58
End: 2023-07-10
Payer: MEDICAID

## 2023-07-10 DIAGNOSIS — I70.1 RENAL ARTERY STENOSIS: ICD-10-CM

## 2023-07-10 PROCEDURE — 99204 PR OFFICE/OUTPT VISIT, NEW, LEVL IV, 45-59 MIN: ICD-10-PCS | Mod: S$GLB,,, | Performed by: SURGERY

## 2023-07-10 PROCEDURE — 1159F MED LIST DOCD IN RCRD: CPT | Mod: CPTII,S$GLB,, | Performed by: SURGERY

## 2023-07-10 PROCEDURE — 99204 OFFICE O/P NEW MOD 45 MIN: CPT | Mod: S$GLB,,, | Performed by: SURGERY

## 2023-07-10 PROCEDURE — 1159F PR MEDICATION LIST DOCUMENTED IN MEDICAL RECORD: ICD-10-PCS | Mod: CPTII,S$GLB,, | Performed by: SURGERY

## 2023-07-10 NOTE — PROGRESS NOTES
[place] - Vascular Surgery  History and Physical      Subjective:     Chief Complaint/Reason for Clinic Visit: Follow-up for R renal artery stenosis     History of Present Illness:  57 yo lady with history of anxiety and hematuria presents to clinic for evaluation of R renal artery stenosis. She was evaluated by Urology and CT Abdomen/Pelvis noted beaded appearance of the right renal artery with alternating segments of dilatation of 1.1 cm and relative stenosis. She has history of hysterectomy. Of note, she denies any history of hypertension. She reports that occasionally she does have some elevated blood pressure when she gets it evaluated at [facility] - reports that blood pressure ranges between 150 and 170. She is not on any blood thinners.     Current Outpatient Medications on File Prior to Visit   Medication Sig Dispense Refill    ALPRAZolam (XANAX) 1 MG tablet       buPROPion (WELLBUTRIN XL) 150 MG TB24 tablet Take 1 tablet by mouth Daily.      diclofenac sodium (VOLTAREN) 1 % Gel Apply 2 g topically 4 (four) times daily. 100 g 3    QUEtiapine (SEROQUEL) 400 MG tablet Take 400 mg by mouth once daily.      venlafaxine (EFFEXOR-XR) 150 MG Cp24 Take 150 mg by mouth once daily.       No current facility-administered medications on file prior to visit.       Review of patient's allergies indicates:  No Known Allergies  Past Medical History:   Diagnosis Date    Anxiety     Cervical cancer     Generalized anxiety disorder 10/25/2022    Hepatic steatosis 6/27/2023    History of cervical cancer 1993    Pure hypercholesterolemia 10/26/2022    Uterine cancer        Past Surgical History:   Procedure Laterality Date    CARPAL TUNNEL RELEASE Right     COLONOSCOPY      ganglionectomy Right     Wrist    HAND SURGERY Right     HYSTERECTOMY      SURGICAL REMOVAL OF LESION OF ORBIT Right     TRIGGER FINGER RELEASE Right     Middle Finger       Family History   Problem Relation Age of Onset    Hypertension Mother     Bone  cancer Maternal Grandfather     Hypertension Father     Melanoma Maternal Uncle     Lung cancer Maternal Uncle        Social History     Socioeconomic History    Marital status: Single   Tobacco Use    Smoking status: Every Day     Packs/day: 0.50     Types: Cigarettes    Smokeless tobacco: Never   Substance and Sexual Activity    Alcohol use: Yes     Comment: RARELY    Drug use: Never    Sexual activity: Not Currently       Review of Systems   Constitutional:  Negative for chills, fever and malaise/fatigue.   Respiratory:  Negative for cough and shortness of breath.    Cardiovascular:  Negative for chest pain, palpitations and claudication.   Gastrointestinal:  Negative for abdominal pain, constipation, diarrhea, nausea and vomiting.   Genitourinary:  Positive for dysuria. Negative for frequency and urgency.   Musculoskeletal:  Negative for neck pain.   Neurological:  Negative for sensory change and headaches.   Psychiatric/Behavioral:  Negative for depression and hallucinations. The patient is not nervous/anxious.      Objective:   There were no vitals taken for this visit.    Physical Exam  Constitutional:       General: She is not in acute distress.     Appearance: Normal appearance. She is normal weight.   HENT:      Head: Normocephalic and atraumatic.   Cardiovascular:      Rate and Rhythm: Normal rate and regular rhythm.   Pulmonary:      Effort: Pulmonary effort is normal. No respiratory distress.      Breath sounds: Normal breath sounds.   Abdominal:      General: Abdomen is flat. Bowel sounds are normal. There is no distension.      Palpations: Abdomen is soft.   Neurological:      General: No focal deficit present.      Mental Status: She is alert and oriented to person, place, and time. Mental status is at baseline.   Psychiatric:         Mood and Affect: Mood normal.         Behavior: Behavior normal.         Thought Content: Thought content normal.     Reviewed CT scan from 06/09/2023 - no acute  abnormality. Beaded appearance of right renal artery with alternating segments of dilatation of 1.1 cm and relative stenosis.   Assessment/Plan:   57 yo lady with history of anxiety and hematuria presents to clinic for evaluation of R renal artery stenosis. She was evaluated by Urology and CT Abdomen/Pelvis noted beaded appearance of the right renal artery with alternating segments of dilatation of 1.1 cm and relative stenosis.    This appears to be consistent with fibromuscular dysplasia of the right renal artery.     - No acute vascular surgery intervention at this time as patient is asymptomatic. No evidence of hypertension. No evidence of declining renal function.  - Follow-up with primary care physician to re-evaluate intermittently elevated hypertension. Patient also interested in obtaining a calcium score given her risk factors for arterial disease  - Answered patient's questions to her satisfaction. Patient expressed understanding of the above plan.     Tank Joyce MD  Vascular Surgery

## 2023-07-11 DIAGNOSIS — E78.00 HYPERCHOLESTEROLEMIA: Primary | ICD-10-CM

## 2023-08-09 ENCOUNTER — PATIENT MESSAGE (OUTPATIENT)
Dept: FAMILY MEDICINE | Facility: CLINIC | Age: 58
End: 2023-08-09
Payer: MEDICAID

## 2023-08-17 ENCOUNTER — OFFICE VISIT (OUTPATIENT)
Dept: CARDIOTHORACIC SURGERY | Facility: CLINIC | Age: 58
End: 2023-08-17
Payer: MEDICAID

## 2023-08-17 VITALS
BODY MASS INDEX: 25.48 KG/M2 | OXYGEN SATURATION: 98 % | HEART RATE: 99 BPM | HEIGHT: 69 IN | DIASTOLIC BLOOD PRESSURE: 71 MMHG | SYSTOLIC BLOOD PRESSURE: 99 MMHG | RESPIRATION RATE: 18 BRPM | WEIGHT: 172 LBS

## 2023-08-17 DIAGNOSIS — I77.3 FIBROMUSCULAR DYSPLASIA: Primary | ICD-10-CM

## 2023-08-17 PROCEDURE — 3008F PR BODY MASS INDEX (BMI) DOCUMENTED: ICD-10-PCS | Mod: CPTII,S$GLB,, | Performed by: SURGERY

## 2023-08-17 PROCEDURE — 1159F PR MEDICATION LIST DOCUMENTED IN MEDICAL RECORD: ICD-10-PCS | Mod: CPTII,S$GLB,, | Performed by: SURGERY

## 2023-08-17 PROCEDURE — 3074F SYST BP LT 130 MM HG: CPT | Mod: CPTII,S$GLB,, | Performed by: SURGERY

## 2023-08-17 PROCEDURE — 99214 OFFICE O/P EST MOD 30 MIN: CPT | Mod: S$GLB,,, | Performed by: SURGERY

## 2023-08-17 PROCEDURE — 3074F PR MOST RECENT SYSTOLIC BLOOD PRESSURE < 130 MM HG: ICD-10-PCS | Mod: CPTII,S$GLB,, | Performed by: SURGERY

## 2023-08-17 PROCEDURE — 1159F MED LIST DOCD IN RCRD: CPT | Mod: CPTII,S$GLB,, | Performed by: SURGERY

## 2023-08-17 PROCEDURE — 99214 PR OFFICE/OUTPT VISIT, EST, LEVL IV, 30-39 MIN: ICD-10-PCS | Mod: S$GLB,,, | Performed by: SURGERY

## 2023-08-17 PROCEDURE — 3078F PR MOST RECENT DIASTOLIC BLOOD PRESSURE < 80 MM HG: ICD-10-PCS | Mod: CPTII,S$GLB,, | Performed by: SURGERY

## 2023-08-17 PROCEDURE — 3078F DIAST BP <80 MM HG: CPT | Mod: CPTII,S$GLB,, | Performed by: SURGERY

## 2023-08-17 PROCEDURE — 3008F BODY MASS INDEX DOCD: CPT | Mod: CPTII,S$GLB,, | Performed by: SURGERY

## 2023-08-17 NOTE — PROGRESS NOTES
[facility] - Vascular Surgery  Follow-up Visit       Subjective:     Chief Complaint/Reason for Clinic Visit:  Follow-up for evaluation of bilateral carotid duplex    History of Present Illness:  58-year-old lady with history of anxiety and hematuria presents to clinic for evaluation of right renal artery stenosis on July 10, 2023.  She presents now for follow-up for evaluation of bilateral carotid duplex due to recent diagnosis of fibromuscular dysplasia based off of CT imaging of the renal arteries.  She denies any history of hypertension.  In the clinic, her blood pressure is normal.  She is otherwise asymptomatic from a carotid or renal artery standpoint.    Current Outpatient Medications on File Prior to Visit   Medication Sig Dispense Refill    ALPRAZolam (XANAX) 1 MG tablet       buPROPion (WELLBUTRIN XL) 150 MG TB24 tablet Take 1 tablet by mouth Daily.      diclofenac sodium (VOLTAREN) 1 % Gel Apply 2 g topically 4 (four) times daily. 100 g 3    QUEtiapine (SEROQUEL) 400 MG tablet Take 400 mg by mouth once daily.      venlafaxine (EFFEXOR-XR) 150 MG Cp24 Take 150 mg by mouth once daily.       No current facility-administered medications on file prior to visit.       Review of patient's allergies indicates:  No Known Allergies  Past Medical History:   Diagnosis Date    Anxiety     Cervical cancer     Generalized anxiety disorder 10/25/2022    Hepatic steatosis 6/27/2023    History of cervical cancer 1993    Pure hypercholesterolemia 10/26/2022    Uterine cancer        Past Surgical History:   Procedure Laterality Date    CARPAL TUNNEL RELEASE Right     COLONOSCOPY      ganglionectomy Right     Wrist    HAND SURGERY Right     HYSTERECTOMY      SURGICAL REMOVAL OF LESION OF ORBIT Right     TRIGGER FINGER RELEASE Right     Middle Finger       Family History   Problem Relation Age of Onset    Hypertension Mother     Bone cancer Maternal Grandfather     Hypertension Father     Melanoma Maternal Uncle     Lung  "cancer Maternal Uncle        Social History     Socioeconomic History    Marital status: Single   Tobacco Use    Smoking status: Every Day     Current packs/day: 0.50     Types: Cigarettes    Smokeless tobacco: Never   Substance and Sexual Activity    Alcohol use: Yes     Comment: RARELY    Drug use: Never    Sexual activity: Not Currently       Review of Systems   Constitutional:  Negative for chills, fever and malaise/fatigue.   Respiratory:  Negative for cough and shortness of breath.    Cardiovascular:  Negative for chest pain and claudication.   Gastrointestinal:  Negative for constipation, diarrhea, nausea and vomiting.   Genitourinary:  Negative for frequency and urgency.        She denies any history of hematuria.  She does note that she has been told that she does have microscopic hematuria   Musculoskeletal:  Negative for back pain and neck pain.   Neurological:  Negative for headaches.   Psychiatric/Behavioral:  Negative for depression. The patient is not nervous/anxious.        Objective:   BP 99/71 (BP Location: Left arm, Patient Position: Sitting, BP Method: Medium (Automatic))   Pulse 99   Resp 18   Ht 5' 9" (1.753 m)   Wt 78 kg (172 lb)   SpO2 98%   BMI 25.40 kg/m²     Physical Exam  Constitutional:       General: She is not in acute distress.     Appearance: Normal appearance.   HENT:      Head: Normocephalic and atraumatic.   Cardiovascular:      Rate and Rhythm: Normal rate and regular rhythm.   Pulmonary:      Effort: Pulmonary effort is normal.      Breath sounds: Normal breath sounds.   Abdominal:      General: Abdomen is flat. Bowel sounds are normal.      Palpations: Abdomen is soft.   Neurological:      General: No focal deficit present.      Mental Status: She is alert and oriented to person, place, and time. Mental status is at baseline.   Psychiatric:         Mood and Affect: Mood normal.         Behavior: Behavior normal.         Thought Content: Thought content normal. "       Reviewed clinic carotid duplex-no evidence of any hemodynamically significant stenosis bilaterally.  Assessment/Plan:   58-year-old lady presents to clinic for evaluation of bilateral carotid duplex after recent diagnosis of fibromuscular dysplasia.  Carotid duplex does not show any evidence of any hemodynamically significant stenosis.  Patient is otherwise asymptomatic.      - Patient can follow up in clinic as needed   - Answered patient's questions to her satisfaction.  Patient expressed understanding of the above plan.    Tank Joyce MD  Vascular Surgery

## 2023-10-18 ENCOUNTER — OFFICE VISIT (OUTPATIENT)
Dept: PULMONOLOGY | Facility: CLINIC | Age: 58
End: 2023-10-18
Payer: MEDICAID

## 2023-10-18 VITALS
SYSTOLIC BLOOD PRESSURE: 114 MMHG | RESPIRATION RATE: 20 BRPM | DIASTOLIC BLOOD PRESSURE: 75 MMHG | HEART RATE: 98 BPM | WEIGHT: 176 LBS | OXYGEN SATURATION: 96 % | HEIGHT: 69 IN | BODY MASS INDEX: 26.07 KG/M2

## 2023-10-18 DIAGNOSIS — J43.2 CENTRILOBULAR EMPHYSEMA: Primary | ICD-10-CM

## 2023-10-18 DIAGNOSIS — Z71.6 TOBACCO ABUSE COUNSELING: ICD-10-CM

## 2023-10-18 DIAGNOSIS — F32.A DEPRESSION, UNSPECIFIED DEPRESSION TYPE: ICD-10-CM

## 2023-10-18 DIAGNOSIS — Z72.0 TOBACCO ABUSE: ICD-10-CM

## 2023-10-18 DIAGNOSIS — R40.0 UNCONTROLLED DAYTIME SOMNOLENCE: ICD-10-CM

## 2023-10-18 DIAGNOSIS — R91.1 SOLITARY PULMONARY NODULE: ICD-10-CM

## 2023-10-18 DIAGNOSIS — G47.33 OBSTRUCTIVE SLEEP APNEA: ICD-10-CM

## 2023-10-18 DIAGNOSIS — G47.19 EXCESSIVE DAYTIME SLEEPINESS: ICD-10-CM

## 2023-10-18 DIAGNOSIS — R91.8 MULTIPLE LUNG NODULES ON CT: ICD-10-CM

## 2023-10-18 DIAGNOSIS — F17.210 CIGARETTE NICOTINE DEPENDENCE WITHOUT COMPLICATION: Chronic | ICD-10-CM

## 2023-10-18 PROCEDURE — 99205 PR OFFICE/OUTPT VISIT, NEW, LEVL V, 60-74 MIN: ICD-10-PCS | Mod: S$GLB,,, | Performed by: INTERNAL MEDICINE

## 2023-10-18 PROCEDURE — 1159F MED LIST DOCD IN RCRD: CPT | Mod: CPTII,S$GLB,, | Performed by: INTERNAL MEDICINE

## 2023-10-18 PROCEDURE — 1159F PR MEDICATION LIST DOCUMENTED IN MEDICAL RECORD: ICD-10-PCS | Mod: CPTII,S$GLB,, | Performed by: INTERNAL MEDICINE

## 2023-10-18 PROCEDURE — 99205 OFFICE O/P NEW HI 60 MIN: CPT | Mod: S$GLB,,, | Performed by: INTERNAL MEDICINE

## 2023-10-18 PROCEDURE — 1160F PR REVIEW ALL MEDS BY PRESCRIBER/CLIN PHARMACIST DOCUMENTED: ICD-10-PCS | Mod: CPTII,S$GLB,, | Performed by: INTERNAL MEDICINE

## 2023-10-18 PROCEDURE — 3074F SYST BP LT 130 MM HG: CPT | Mod: CPTII,S$GLB,, | Performed by: INTERNAL MEDICINE

## 2023-10-18 PROCEDURE — 3078F PR MOST RECENT DIASTOLIC BLOOD PRESSURE < 80 MM HG: ICD-10-PCS | Mod: CPTII,S$GLB,, | Performed by: INTERNAL MEDICINE

## 2023-10-18 PROCEDURE — 3008F PR BODY MASS INDEX (BMI) DOCUMENTED: ICD-10-PCS | Mod: CPTII,S$GLB,, | Performed by: INTERNAL MEDICINE

## 2023-10-18 PROCEDURE — 3078F DIAST BP <80 MM HG: CPT | Mod: CPTII,S$GLB,, | Performed by: INTERNAL MEDICINE

## 2023-10-18 PROCEDURE — 3074F PR MOST RECENT SYSTOLIC BLOOD PRESSURE < 130 MM HG: ICD-10-PCS | Mod: CPTII,S$GLB,, | Performed by: INTERNAL MEDICINE

## 2023-10-18 PROCEDURE — 1160F RVW MEDS BY RX/DR IN RCRD: CPT | Mod: CPTII,S$GLB,, | Performed by: INTERNAL MEDICINE

## 2023-10-18 PROCEDURE — 3008F BODY MASS INDEX DOCD: CPT | Mod: CPTII,S$GLB,, | Performed by: INTERNAL MEDICINE

## 2023-10-18 RX ORDER — IPRATROPIUM BROMIDE 17 UG/1
2 AEROSOL, METERED RESPIRATORY (INHALATION) EVERY 6 HOURS PRN
Qty: 12.9 G | Refills: 0 | Status: SHIPPED | OUTPATIENT
Start: 2023-10-18 | End: 2024-02-05

## 2023-10-18 RX ORDER — UMECLIDINIUM BROMIDE AND VILANTEROL TRIFENATATE 62.5; 25 UG/1; UG/1
1 POWDER RESPIRATORY (INHALATION) DAILY
Qty: 1 EACH | Refills: 5 | Status: SHIPPED | OUTPATIENT
Start: 2023-10-18

## 2023-10-18 NOTE — PROGRESS NOTES
Pulmonary Medicine Clinic Note    Patient Identification:   Patient ID: Elizabeth Gomez is a 58 y.o. female.    Referring Provider:  Nika Orellana     Chief Complaint:  Excessive Daytime sleepiness and exertional dyspnea    History of Present Illness:    Elizabeth Gomez is a 58 y.o. female who presents with above-mentioned complaints.  Initial referral is for sleep apnea evaluation but it appears that patient is also asking about a PFT that she had done and her exertional dyspnea as well as an abnormal CT chest.    Patient does not have a fixed schedule of going to bed.  She does have underlying depression and significant anxiety.  She does take Effexor which does help with her depression.  She has recently been placed on Seroquel which helps her sleep to that extent that she on occasions would sleep throughout the day.  He does go bed 2 around midnight and at that time she takes her Seroquel.  Effexor she takes quite early.  She had been on Wellbutrin before and on Prozac are prior to that.  She has been told that she snores but no recall of witnessed apneas.  He does have dry mouth upon awakening but denies any headaches.  He does not know of a family history of sleep apnea.  Her father had sleep apnea but he was not her biological father.  She does not drink alcohol.  She does smoke and is currently smoking about half a pack a day.  She has done it for many years.  Her tonsils were removed when she was 19 years old.  He has history of COVID-19 pneumonia requiring 1 night of hospitalization.  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  He has history of uterine/cervical cancer status post surgery when she was 29 years old.  She has used albuterol as a rescue inhaler for her shortness of breath.  This causes side effects and she does not want to take it anymore.  He used to have recurrent bronchitis but that has not happened for couple of years.  She was told that she has  centrilobular emphysema and a PFT was obtained which revealed a normal spirometry with a low FEV1 to FVC that did not meet gold criteria for COPD.  There was hyperinflation and mildly reduced diffusion capacity likely due to emphysema and active tobacco use.  A CT chest from April 2023 was reviewed which showed right upper and left upper lobes granulomas with a left lower lobe nodule with central calcification possibly granuloma as well.  A repeat CT chest was recommended in 3 months but I do not find 1 in the chart.    Review of Systems:  Review of Systems   Constitutional:  Negative for fever, chills, weight loss, weight gain, activity change, appetite change, fatigue, night sweats and weakness.   HENT:  Negative for nosebleeds, postnasal drip, rhinorrhea, sinus pressure, sore throat, trouble swallowing, voice change, congestion, ear pain and hearing loss.    Eyes:  Negative for redness and itching.   Respiratory:  Positive for snoring, shortness of breath, wheezing, dyspnea on extertion and somnolence. Negative for cough, hemoptysis, sputum production, choking, chest tightness, orthopnea, previous hospitialization due to pulmonary problems, asthma nighttime symptoms, pleurisy, use of rescue inhaler and Paroxysmal Nocturnal Dyspnea.    Cardiovascular:  Negative for chest pain, palpitations and leg swelling.   Genitourinary:  Negative for difficulty urinating and hematuria.   Endocrine:  Negative for polydipsia, polyphagia, cold intolerance, heat intolerance and polyuria.    Musculoskeletal:  Negative for arthralgias, back pain, gait problem, joint swelling and myalgias.   Skin:  Negative for rash.   Gastrointestinal:  Negative for nausea, vomiting, abdominal pain, abdominal distention and acid reflux.   Neurological:  Negative for dizziness, syncope, weakness, light-headedness and headaches.   Hematological:  Negative for adenopathy. Does not bruise/bleed easily and no excessive bruising.   Psychiatric/Behavioral:   Negative for confusion and sleep disturbance. The patient is not nervous/anxious.        Allergies:   Review of patient's allergies indicates:   Allergen Reactions    Clarithromycin     Codeine     Sulfa (sulfonamide antibiotics) Nausea And Vomiting       Medications:      Past Medical History:      Past Medical History:   Diagnosis Date    Anxiety     Cervical cancer     Generalized anxiety disorder 10/25/2022    Hepatic steatosis 6/27/2023    History of cervical cancer 1993    Pure hypercholesterolemia 10/26/2022    Uterine cancer        Family History:      Family History   Problem Relation Age of Onset    Hypertension Mother     Bone cancer Maternal Grandfather     Hypertension Father     Melanoma Maternal Uncle     Lung cancer Maternal Uncle         Social History:      Past Surgical History:   Procedure Laterality Date    CARPAL TUNNEL RELEASE Right     COLONOSCOPY      ganglionectomy Right     Wrist    HAND SURGERY Right     HYSTERECTOMY      SURGICAL REMOVAL OF LESION OF ORBIT Right     TRIGGER FINGER RELEASE Right     Middle Finger       Physical Exam:  Vitals:    10/18/23 1114   BP: 114/75   Pulse: 98   Resp: 20     Physical Exam   Constitutional: She is oriented to person, place, and time. She appears not cachectic. No distress.   HENT:   Head: Normocephalic.   Right Ear: External ear normal.   Left Ear: External ear normal.   Nose: Nose normal. No mucosal edema. No polyps.   Mouth/Throat: Oropharynx is clear and moist. Normal dentition. No oropharyngeal exudate. Mallampati Score: III.   Neck: No JVD present. No tracheal deviation present. No thyromegaly present.   Cardiovascular: Normal rate, regular rhythm, normal heart sounds and intact distal pulses. Exam reveals no gallop and no friction rub.   No murmur heard.  Pulmonary/Chest: Normal expansion, symmetric chest wall expansion, effort normal and breath sounds normal. No stridor. No respiratory distress. She has no decreased breath sounds. She has no  wheezes. She has no rhonchi. She has no rales. Chest wall is not dull to percussion. She exhibits no tenderness. Negative for egophony. Negative for tactile fremitus.   Abdominal: Soft. Bowel sounds are normal. She exhibits no distension and no mass. There is no hepatosplenomegaly. There is no abdominal tenderness. There is no rebound and no guarding. No hernia.   Musculoskeletal:         General: No tenderness, deformity or edema. Normal range of motion.      Cervical back: Normal range of motion and neck supple.   Lymphadenopathy: No supraclavicular adenopathy is present.     She has no cervical adenopathy.     She has no axillary adenopathy.   Neurological: She is alert and oriented to person, place, and time. She has normal reflexes. She displays normal reflexes. No cranial nerve deficit. She exhibits normal muscle tone.   Skin: Skin is warm and dry. No rash noted. She is not diaphoretic. No cyanosis or erythema. No pallor. Nails show no clubbing.   Psychiatric: She has a normal mood and affect. Her behavior is normal. Judgment and thought content normal.         Accessory Clinical Data:  Previous Sleep Study findings:  None    ESS = 19    STOPBANG =  3    Do you snore loudly?y  Do you feel tired, fatigued or sleepy during the day?y  Has anyone observed that you stop breathing in your sleep?      Do you have or been treated for high blood pressure?  BMI:  Age > 50? y  Neck circumference > 40 cm: n  Gender:  f    I have personally reviewed the CT chest and findings are dictated under HPI.    PFTs revealed post-bronchodilator FEV1 and FVC of normal values with no significant bronchodilator response.  Ratio is 77.  FRC is 134% predicted and diffusion capacity is 62% predicted.  Overall PFTs are representative of emphysema with active tobacco use.    Assessment and Plan:    Problem List Items Addressed This Visit          Pulmonary    Centrilobular emphysema - Primary    Relevant Medications     umeclidinium-vilanteroL (ANORO ELLIPTA) 62.5-25 mcg/actuation DsDv    ipratropium (ATROVENT HFA) 17 mcg/actuation inhaler       Other    Cigarette nicotine dependence without complication (Chronic)    Overview     offered referral to [facility]; she want to explore hypnosis for smoking cessation with a [place] therapist instead           Other Visit Diagnoses       Uncontrolled daytime somnolence        Tobacco abuse        Tobacco abuse counseling        Multiple lung nodules on CT        Solitary pulmonary nodule        Relevant Orders    CT Chest Without Contrast    Excessive daytime sleepiness        Relevant Orders    Polysomnogram (CPAP will be added if patient meets diagnostic criteria.)    Depression, unspecified depression type        Obstructive sleep apnea        Relevant Orders    Polysomnogram (CPAP will be added if patient meets diagnostic criteria.)           Most of her excessive daytime sleepiness appears to be medication related and also contributed by her underlying depression and sleep schedule.  However, underlying sleep disordered breathing can not be ruled out without a sleep study.  An ideal test would be an in-lab polysomnogram.  A home sleep test is not recommended for this patient given significant depression/anxiety and sleep protein etcetera.  If there is no sleep apnea, a sleep diary or active graft he may help further understanding her sleeping pattern.  She may need a multiple sleep latency test if sleep study does not reveal significant sleep apnea to rule out central hypersomnia.    Regarding emphysema, she needs to be on maintenance inhaler.  Anoro is an excellent choice to start with.  We will also give p.r.n. Atrovent inhaler as she had side effects from using albuterol.    Most of those lung nodules seem to be granulomas, a repeat CT chest was recommended in 3 months and is overdue.  We will obtain a CT chest without contrast.    Tobacco cessation was  advised and patient seemed to be determined to quit.    More than 50% of 60 min time was spent face-to-face on counseling, in reviewing the imaging studies, reviewing notes from primary care provider, performing appropriate examination, counseling and educating the patient regarding the findings on CT scan, ordering medications and appropriate follow-up imaging and sleep studies, documenting clinical information in the electronic medical record, care coordination as well as communicating with the primary referring physician.    Follow up for after sleep study and CT chest.  Thank you very much for involving me in the care of this patient.  Please do not hesitate to reach me if you have any further questions or concerns.

## 2023-10-24 PROBLEM — E78.2 MIXED HYPERLIPIDEMIA: Status: ACTIVE | Noted: 2022-10-26

## 2023-12-05 ENCOUNTER — OUTSIDE PLACE OF SERVICE (OUTPATIENT)
Dept: PULMONOLOGY | Facility: CLINIC | Age: 58
End: 2023-12-05
Payer: MEDICAID

## 2023-12-06 PROCEDURE — 95810 PR POLYSOMNOGRAPHY, 4 OR MORE: ICD-10-PCS | Mod: 26,,, | Performed by: INTERNAL MEDICINE

## 2023-12-06 PROCEDURE — 95810 POLYSOM 6/> YRS 4/> PARAM: CPT | Mod: 26,,, | Performed by: INTERNAL MEDICINE

## 2023-12-07 ENCOUNTER — DOCUMENTATION ONLY (OUTPATIENT)
Dept: SLEEP MEDICINE | Facility: HOSPITAL | Age: 58
End: 2023-12-07
Payer: MEDICAID

## 2024-02-05 DIAGNOSIS — J43.2 CENTRILOBULAR EMPHYSEMA: ICD-10-CM

## 2024-02-05 RX ORDER — IPRATROPIUM BROMIDE 17 UG/1
AEROSOL, METERED RESPIRATORY (INHALATION)
Qty: 12.9 G | Refills: 0 | Status: SHIPPED | OUTPATIENT
Start: 2024-02-05 | End: 2024-04-02

## 2024-03-05 ENCOUNTER — TELEPHONE (OUTPATIENT)
Dept: PULMONOLOGY | Facility: CLINIC | Age: 59
End: 2024-03-05
Payer: MEDICAID

## 2024-03-05 NOTE — TELEPHONE ENCOUNTER
----- Message from Leticia Macias sent at 3/5/2024 12:39 PM CST -----  Contact: pt  Pt calling about a follow up appt and she can be reached at [phone]     Thanks,

## 2024-03-19 ENCOUNTER — TELEPHONE (OUTPATIENT)
Dept: FAMILY MEDICINE | Facility: CLINIC | Age: 59
End: 2024-03-19
Payer: MEDICAID

## 2024-03-19 NOTE — TELEPHONE ENCOUNTER
[facility]. Apt time needs to be changed due to provider being out of the office      Portal message sent as well  Apt canceled

## 2024-03-26 ENCOUNTER — TELEPHONE (OUTPATIENT)
Dept: FAMILY MEDICINE | Facility: CLINIC | Age: 59
End: 2024-03-26
Payer: MEDICAID

## 2024-03-26 DIAGNOSIS — Z00.00 PREVENTATIVE HEALTH CARE: Primary | ICD-10-CM

## 2024-03-26 DIAGNOSIS — Z12.39 ENCOUNTER FOR SCREENING FOR MALIGNANT NEOPLASM OF BREAST, UNSPECIFIED SCREENING MODALITY: ICD-10-CM

## 2024-03-26 NOTE — TELEPHONE ENCOUNTER
----- Message from Susie Guerrero sent at 3/25/2024  5:53 PM CDT -----  Type:  Mammogram    Caller is requesting to schedule their annual mammogram appointment.  Order is not listed in EPIC.  Please enter order and contact patient to schedule.  Name of Caller: Pt  Where would they like the mammogram performed?  Would the patient rather a call back or a response via [facility]? Call  Best Call Back Number: [phone]  Additional Information:

## 2024-04-01 DIAGNOSIS — J43.2 CENTRILOBULAR EMPHYSEMA: ICD-10-CM

## 2024-04-02 RX ORDER — IPRATROPIUM BROMIDE 17 UG/1
AEROSOL, METERED RESPIRATORY (INHALATION)
Qty: 12.9 G | Refills: 0 | Status: SHIPPED | OUTPATIENT
Start: 2024-04-02

## 2024-04-08 ENCOUNTER — OFFICE VISIT (OUTPATIENT)
Dept: FAMILY MEDICINE | Facility: CLINIC | Age: 59
End: 2024-04-08
Payer: MEDICAID

## 2024-04-08 DIAGNOSIS — M51.36 DDD (DEGENERATIVE DISC DISEASE), LUMBAR: Chronic | ICD-10-CM

## 2024-04-08 DIAGNOSIS — L98.9 MULTIPLE LESIONS OF FACE: ICD-10-CM

## 2024-04-08 DIAGNOSIS — M54.50 CHRONIC MIDLINE LOW BACK PAIN WITHOUT SCIATICA: Chronic | ICD-10-CM

## 2024-04-08 DIAGNOSIS — Z12.31 BREAST CANCER SCREENING BY MAMMOGRAM: ICD-10-CM

## 2024-04-08 DIAGNOSIS — K13.0 LESION OF LIP: Chronic | ICD-10-CM

## 2024-04-08 DIAGNOSIS — G89.29 CHRONIC MIDLINE LOW BACK PAIN WITHOUT SCIATICA: Chronic | ICD-10-CM

## 2024-04-08 DIAGNOSIS — B00.9 HSV INFECTION: Primary | ICD-10-CM

## 2024-04-08 PROCEDURE — 99214 OFFICE O/P EST MOD 30 MIN: CPT | Mod: 95,,, | Performed by: NURSE PRACTITIONER

## 2024-04-08 RX ORDER — VALACYCLOVIR HYDROCHLORIDE 1 G/1
1000 TABLET, FILM COATED ORAL 2 TIMES DAILY
Qty: 14 TABLET | Refills: 3 | Status: SHIPPED | OUTPATIENT
Start: 2024-04-08

## 2024-04-08 NOTE — PROGRESS NOTES
Subjective:       Patient ID: Elizabeth Gomez is a 58 y.o. female.    Chief Complaint: numerous complaints...fever blister; suspicious lesions face, back pain, neck pain    Ms Johnson returns to our clinic. It's been approx 1 year since our last visit. She had to cancel numerous appts because of 4 family member passing in the last year.     She lives in [place]. She is working on and off--she does [employer]. She is single. She smokes approx 1/2 ppd. She has tried hypnosis in the past which did help lessened frequency of smoking.   History of generalized anxiety disorder, uterine/cervical cancer, HLD, DDD cervical spine, DDD lumbar spine, chronic low back pain, and OA mainly affecting hands. Her mental health specialist is Cheikh Cuevas. Her gastroenterologist is Dr Zakia Palm.     She has worked many years in [employer] in the sun, with and without sunscreen use. She has developed lesions x 2 on left side of nose and lesion discolored lesion on her lower lip. She feels that they are changing in size/shape/color.     She has active fever blister on right side of top lip that she would like treatment.     Persistent, chronic low back pain. Pain is primarily isolated in her lower back. She is having a difficult time working in [employer] due to her pain.         Review of Systems   Constitutional:  Positive for activity change. Negative for unexpected weight change.   HENT:  Negative for hearing loss, rhinorrhea and trouble swallowing.    Eyes:  Positive for visual disturbance. Negative for discharge.   Respiratory:  Positive for chest tightness and wheezing.    Cardiovascular:  Positive for palpitations. Negative for chest pain.   Gastrointestinal:  Positive for blood in stool and constipation. Negative for diarrhea and vomiting.   Endocrine: Negative for polydipsia and polyuria.   Genitourinary:  Positive for hematuria. Negative for difficulty urinating, dysuria and menstrual problem.   Musculoskeletal:  Positive  for arthralgias, joint swelling and neck pain.   Neurological:  Positive for weakness. Negative for headaches.   Psychiatric/Behavioral:  Positive for dysphoric mood. Negative for confusion.            Past Medical History:  Past Medical History:   Diagnosis Date    Anxiety     Cervical cancer     Generalized anxiety disorder 10/25/2022    Hepatic steatosis 6/27/2023    History of cervical cancer 1993    Pure hypercholesterolemia 10/26/2022    Uterine cancer       Past Surgical History:   Procedure Laterality Date    CARPAL TUNNEL RELEASE Right     COLONOSCOPY      ganglionectomy Right     Wrist    HAND SURGERY Right     HYSTERECTOMY      SURGICAL REMOVAL OF LESION OF ORBIT Right     TRIGGER FINGER RELEASE Right     Middle Finger      Review of patient's allergies indicates:   Allergen Reactions    Clarithromycin     Codeine     Sulfa (sulfonamide antibiotics) Nausea And Vomiting      Current Outpatient Medications   Medication Sig Dispense Refill    ALPRAZolam (XANAX) 1 MG tablet       ATROVENT HFA 17 mcg/actuation inhaler INHALE 2 PUFFS BY MOUTH INTO THE LUNGS EVERY 6 (SIX) HOURS AS NEEDED FOR WHEEZING/ FOR SHORTNESS OF BREATH 12.9 g 0    buPROPion (WELLBUTRIN XL) 150 MG TB24 tablet Take 1 tablet by mouth Daily.      diclofenac sodium (VOLTAREN) 1 % Gel Apply 2 g topically 4 (four) times daily. 100 g 3    QUEtiapine (SEROQUEL) 400 MG tablet Take 400 mg by mouth once daily.      umeclidinium-vilanteroL (ANORO ELLIPTA) 62.5-25 mcg/actuation DsDv Inhale 1 puff into the lungs once daily. Controller 1 each 5    valACYclovir (VALTREX) 1000 MG tablet Take 1 tablet (1,000 mg total) by mouth 2 (two) times daily. 14 tablet 3    venlafaxine (EFFEXOR-XR) 150 MG Cp24 Take 150 mg by mouth once daily.       No current facility-administered medications for this visit.     Social History     Socioeconomic History    Marital status: Single   Tobacco Use    Smoking status: Every Day     Current packs/day: 0.50     Types: Cigarettes     Smokeless tobacco: Never   Substance and Sexual Activity    Alcohol use: Yes     Comment: RARELY    Drug use: Never    Sexual activity: Not Currently     Social Determinants of Health     Financial Resource Strain: Medium Risk (4/8/2024)    Overall Financial Resource Strain (CARDIA)     Difficulty of Paying Living Expenses: Somewhat hard   Food Insecurity: No Food Insecurity (4/8/2024)    Hunger Vital Sign     Worried About Running Out of Food in the Last Year: Never true     Ran Out of Food in the Last Year: Never true   Transportation Needs: Unknown (4/8/2024)    PRAPARE - Transportation     Lack of Transportation (Medical): No     Lack of Transportation (Non-Medical): Patient declined   Physical Activity: Unknown (4/8/2024)    Exercise Vital Sign     Days of Exercise per Week: Patient declined     Minutes of Exercise per Session: 120 min   Stress: Stress Concern Present (4/8/2024)    [place] [employer] - Occupational Stress Questionnaire     Feeling of Stress : To some extent   Social Connections: Unknown (4/8/2024)    Social Connection and Isolation Panel [NHANES]     Frequency of Communication with Friends and Family: More than three times a week     Frequency of Social Gatherings with Friends and Family: Once a week     Active Member of Clubs or Organizations: No     Attends Club or Organization Meetings: Never     Marital Status: Never    Housing Stability: High Risk (4/8/2024)    Housing Stability Vital Sign     Unable to Pay for Housing in the Last Year: Yes     Number of Places Lived in the Last Year: 2     Unstable Housing in the Last Year: No      Family History   Problem Relation Age of Onset    Hypertension Mother     Bone cancer Maternal Grandfather     Hypertension Father     Melanoma Maternal Uncle     Lung cancer Maternal Uncle         Objective:      Physical Exam  Neurological:      Mental Status: She is alert and oriented to person, place, and time.   Psychiatric:          Mood and Affect: Mood normal.         Behavior: Behavior normal.         Assessment:     1. HSV infection Active   2. Multiple lesions of face Active   3. Lesion of lip Active   4. Chronic midline low back pain without sciatica Active   5. DDD (degenerative disc disease), lumbar Active   6. Breast cancer screening by mammogram      Plan:       PROBLEM LIST     HSV infection  Comments:  starting her on oral antiviral  Orders:  -     valACYclovir (VALTREX) 1000 MG tablet; Take 1 tablet (1,000 mg total) by mouth 2 (two) times daily.  Dispense: 14 tablet; Refill: 3    Multiple lesions of face  Comments:  arranging Derm eval; lesions x 2 around nose changing in size and shape  Orders:  -     Ambulatory referral/consult to Dermatology; Future; Expected date: 04/15/2024    Lesion of lip  Comments:  arranging Derm eval; lesion changing in color  Orders:  -     Ambulatory referral/consult to Dermatology; Future; Expected date: 04/15/2024    Chronic midline low back pain without sciatica  Comments:  recommend 6 wk PT  Orders:  -     Ambulatory referral/consult to Physical/Occupational Therapy; Future; Expected date: 04/15/2024    DDD (degenerative disc disease), lumbar  Comments:  as above  Orders:  -     Ambulatory referral/consult to Physical/Occupational Therapy; Future; Expected date: 04/15/2024    Breast cancer screening by mammogram  -     Mammo Digital Screening Bilat; Future; Expected date: 04/08/2024        The patient location is: [facility]  The chief complaint leading to consultation is: back pain, suspicious facial lesions, fever blister    Visit type: audiovisual    Face to Face time with patient: 30    minutes of total time spent on the encounter, which includes face to face time and non-face to face time preparing to see the patient (eg, review of tests), Obtaining and/or reviewing separately obtained history, Documenting clinical information in the electronic or other health record, Independently  interpreting results (not separately reported) and communicating results to the patient/family/caregiver, or Care coordination (not separately reported).         Each patient to whom he or she provides medical services by telemedicine is:  (1) informed of the relationship between the physician and patient and the respective role of any other health care provider with respect to management of the patient; and (2) notified that he or she may decline to receive medical services by telemedicine and may withdraw from such care at any time.

## 2024-04-10 ENCOUNTER — TELEPHONE (OUTPATIENT)
Dept: FAMILY MEDICINE | Facility: CLINIC | Age: 59
End: 2024-04-10
Payer: MEDICAID

## 2024-04-10 NOTE — TELEPHONE ENCOUNTER
Wanted to know if we received a med reconciliation paper for pt that needs to be signed by Nika. Informed them I did not see one. Confirmed fax number. They are re-faxing the med rec

## 2024-04-10 NOTE — TELEPHONE ENCOUNTER
----- Message from Colleen Portillo sent at 4/10/2024  9:50 AM CDT -----  Contact: [facility]  Type: Staff Message     Who called: [facility]  Call back number: [phone]  Reason for the call: referral sent over   Additional information: ID #[id number]

## 2024-04-16 ENCOUNTER — TELEPHONE (OUTPATIENT)
Dept: GASTROENTEROLOGY | Facility: CLINIC | Age: 59
End: 2024-04-16
Payer: MEDICAID

## 2024-04-18 ENCOUNTER — TELEPHONE (OUTPATIENT)
Dept: GASTROENTEROLOGY | Facility: CLINIC | Age: 59
End: 2024-04-18
Payer: MEDICAID

## 2024-04-18 NOTE — TELEPHONE ENCOUNTER
Returned pt call. Explained that [facility] does not have clinic on Mon or Wed anymore and Sal[facility]ador was calling to r/s your 1 yr OV. I asked if 5/7/24 -Tues would work for her schedule. Pt stated that would be fine. herbert

## 2024-04-18 NOTE — TELEPHONE ENCOUNTER
----- Message from Kassie Holman sent at 4/18/2024  4:00 PM CDT -----  Regarding: Return Call  Contact: patient   Type:  Patient Returning Call    Who Called:gilbert   Who Left Message for Patient: Rene   Does the patient know what this is regarding?: an appointment   Would the patient rather a call back or a response via [facility]?  Call back   Best Call Back Number: [phone] (home)     Additional Information:     Thanks  SJ

## 2024-04-30 DIAGNOSIS — J43.2 CENTRILOBULAR EMPHYSEMA: ICD-10-CM

## 2024-05-01 ENCOUNTER — PATIENT MESSAGE (OUTPATIENT)
Dept: FAMILY MEDICINE | Facility: CLINIC | Age: 59
End: 2024-05-01
Payer: MEDICAID

## 2024-05-01 RX ORDER — UMECLIDINIUM BROMIDE AND VILANTEROL TRIFENATATE 62.5; 25 UG/1; UG/1
1 POWDER RESPIRATORY (INHALATION)
Qty: 60 EACH | Refills: 4 | Status: SHIPPED | OUTPATIENT
Start: 2024-05-01

## 2024-05-06 ENCOUNTER — OFFICE VISIT (OUTPATIENT)
Dept: FAMILY MEDICINE | Facility: CLINIC | Age: 59
End: 2024-05-06
Payer: MEDICAID

## 2024-05-06 ENCOUNTER — TELEPHONE (OUTPATIENT)
Dept: FAMILY MEDICINE | Facility: CLINIC | Age: 59
End: 2024-05-06

## 2024-05-06 DIAGNOSIS — M51.36 DDD (DEGENERATIVE DISC DISEASE), LUMBAR: ICD-10-CM

## 2024-05-06 DIAGNOSIS — L98.9 MULTIPLE LESIONS OF FACE: Primary | Chronic | ICD-10-CM

## 2024-05-06 DIAGNOSIS — G89.29 CHRONIC MIDLINE LOW BACK PAIN WITHOUT SCIATICA: Chronic | ICD-10-CM

## 2024-05-06 DIAGNOSIS — M54.50 CHRONIC MIDLINE LOW BACK PAIN WITHOUT SCIATICA: Chronic | ICD-10-CM

## 2024-05-06 PROCEDURE — 3078F DIAST BP <80 MM HG: CPT | Mod: CPTII,95,, | Performed by: NURSE PRACTITIONER

## 2024-05-06 PROCEDURE — 3074F SYST BP LT 130 MM HG: CPT | Mod: CPTII,95,, | Performed by: NURSE PRACTITIONER

## 2024-05-06 PROCEDURE — 99213 OFFICE O/P EST LOW 20 MIN: CPT | Mod: 95,,, | Performed by: NURSE PRACTITIONER

## 2024-05-06 PROCEDURE — 1159F MED LIST DOCD IN RCRD: CPT | Mod: CPTII,95,, | Performed by: NURSE PRACTITIONER

## 2024-05-06 PROCEDURE — 1160F RVW MEDS BY RX/DR IN RCRD: CPT | Mod: CPTII,95,, | Performed by: NURSE PRACTITIONER

## 2024-05-06 NOTE — PROGRESS NOTES
Subjective:       Patient ID: Elizabeth Gomez is a 58 y.o. female.    Chief Complaint: No chief complaint on file.     She lives in [place]. She is working on and off--she does [email or web address]. She is single. She smokes approx 1/2 ppd. She has tried hypnosis in the past which did help lessened frequency of smoking.   Medical problems include generalized anxiety disorder, uterine/cervical cancer, HLD, DDD cervical spine, DDD lumbar spine, chronic low back pain, and OA mainly affecting hands. Her mental health specialist is Cheikh Cuevas. Her gastroenterologist is Dr Zakia Palm.      She has worked many years in [email or web address] in the sun, with and without sunscreen use. She has developed lesions x 2 on left side of nose and lesion discolored lesion on her lower lip. She feels that they are changing in size/shape/color. She has dermatology appt scheduled at [facility] Sept 23.    Persistent, chronic low back pain. Pain is primarily isolated in her lower back. She is having a difficult time working in [email or web address] due to her pain; Recommend PT at our previous appt. She is still trying to decide between 2 different PT clinics. She will call and let me know which one she decides on.                   Review of Systems   Constitutional:  Positive for activity change.   HENT:  Negative for hearing loss, rhinorrhea and trouble swallowing.    Eyes:  Positive for visual disturbance. Negative for discharge.   Respiratory:  Positive for chest tightness and wheezing.    Cardiovascular:  Positive for chest pain and palpitations.   Gastrointestinal:  Positive for constipation and diarrhea. Negative for vomiting.   Genitourinary:  Negative for difficulty urinating, dysuria, hematuria and menstrual problem.   Musculoskeletal:  Positive for arthralgias and joint swelling.   Neurological:  Positive for weakness. Negative for headaches.   Psychiatric/Behavioral:  Positive for dysphoric mood. Negative for confusion.            Past  Medical History:  Past Medical History:   Diagnosis Date    Anxiety     Cervical cancer     Generalized anxiety disorder 10/25/2022    Hepatic steatosis 06/27/2023    History of cervical cancer 1993    Personal history of colonic polyps     Pure hypercholesterolemia 10/26/2022    Uterine cancer       Past Surgical History:   Procedure Laterality Date    CARPAL TUNNEL RELEASE Right     COLONOSCOPY      ganglionectomy Right     Wrist    HAND SURGERY Right     HYSTERECTOMY      SURGICAL REMOVAL OF LESION OF ORBIT Right     TRIGGER FINGER RELEASE Right     Middle Finger      Review of patient's allergies indicates:   Allergen Reactions    Clarithromycin     Codeine     Sulfa (sulfonamide antibiotics) Nausea And Vomiting      Current Outpatient Medications   Medication Sig Dispense Refill    ALPRAZolam (XANAX) 1 MG tablet       ANORO ELLIPTA 62.5-25 mcg/actuation DsDv INHALE 1 PUFF INTO THE LUNGS ONCE DAILY. 60 each 4    ATROVENT HFA 17 mcg/actuation inhaler INHALE 2 PUFFS BY MOUTH INTO THE LUNGS EVERY 6 (SIX) HOURS AS NEEDED FOR WHEEZING/ FOR SHORTNESS OF BREATH 12.9 g 0    diclofenac sodium (VOLTAREN) 1 % Gel Apply 2 g topically 4 (four) times daily. 100 g 3    polyethylene glycol (GOLYTELY) 236-22.74-6.74 -5.86 gram suspension Take 8,000 mLs (8 L total) by mouth once. for 1 dose 8000 mL 0    QUEtiapine (SEROQUEL) 400 MG tablet Take 400 mg by mouth once daily.      valACYclovir (VALTREX) 1000 MG tablet Take 1 tablet (1,000 mg total) by mouth 2 (two) times daily. 14 tablet 3    venlafaxine (EFFEXOR-XR) 150 MG Cp24 Take 150 mg by mouth once daily.       No current facility-administered medications for this visit.     Social History     Socioeconomic History    Marital status: Single   Tobacco Use    Smoking status: Every Day     Current packs/day: 0.50     Types: Cigarettes    Smokeless tobacco: Never   Substance and Sexual Activity    Alcohol use: Yes     Comment: RARELY    Drug use: Never    Sexual activity: Not  Currently     Social Determinants of Health     Financial Resource Strain: Medium Risk (4/8/2024)    Overall Financial Resource Strain (CARDIA)     Difficulty of Paying Living Expenses: Somewhat hard   Food Insecurity: No Food Insecurity (4/8/2024)    Hunger Vital Sign     Worried About Running Out of Food in the Last Year: Never true     Ran Out of Food in the Last Year: Never true   Transportation Needs: Unknown (4/8/2024)    PRAPARE - Transportation     Lack of Transportation (Medical): No     Lack of Transportation (Non-Medical): Patient declined   Physical Activity: Unknown (4/8/2024)    Exercise Vital Sign     Days of Exercise per Week: Patient declined     Minutes of Exercise per Session: 120 min   Stress: Stress Concern Present (4/8/2024)    [place] [employer] - Occupational Stress Questionnaire     Feeling of Stress : To some extent   Housing Stability: High Risk (4/8/2024)    Housing Stability Vital Sign     Unable to Pay for Housing in the Last Year: Yes     Number of Places Lived in the Last Year: 2     Unstable Housing in the Last Year: No      Family History   Problem Relation Name Age of Onset    Hypertension Mother      Hypertension Father      Melanoma Maternal Uncle      Lung cancer Maternal Uncle      Bone cancer Maternal Grandfather      Colon cancer Neg Hx      Esophageal cancer Neg Hx      Inflammatory bowel disease Neg Hx      Irritable bowel syndrome Neg Hx      Liver cancer Neg Hx      Rectal cancer Neg Hx      Stomach cancer Neg Hx      Ulcerative colitis Neg Hx          Objective:      Physical Exam  Neurological:      Mental Status: She is alert and oriented to person, place, and time.   Psychiatric:         Mood and Affect: Mood normal.         Behavior: Behavior normal.         Assessment:     1. Multiple lesions of face Active   2. Chronic midline low back pain without sciatica Active   3. DDD (degenerative disc disease), lumbar      Plan:       PROBLEM LIST      Multiple lesions of face  Comments:  keep upcoming appt w/ [facility] Sept 23    Chronic midline low back pain without sciatica  Comments:  Recommend PT at our previous appt. She is still trying to decide between 2 different PT clinics. She will call and let me know which one she decides on.    DDD (degenerative disc disease), lumbar        The patient [facility] is: [facility]  The chief complaint leading to consultation is: back pain, suspicious facial lesions     Visit type: audiovisual     Face to Face time with patient: 20    minutes of total time spent on the encounter, which includes face to face time and non-face to face time preparing to see the patient (eg, review of tests), Obtaining and/or reviewing separately obtained history, Documenting clinical information in the electronic or other health record, Independently interpreting results (not separately reported) and communicating results to the patient/family/caregiver, or Care coordination (not separately reported).            Each patient to whom he or she provides medical services by telemedicine is:  (1) informed of the relationship between the physician and patient and the respective role of any other health care provider with respect to management of the patient; and (2) notified that he or she may decline to receive medical services by telemedicine and may withdraw from such care at any time.

## 2024-05-06 NOTE — TELEPHONE ENCOUNTER
----- Message from Mimi Cantu sent at 5/6/2024 10:45 AM CDT -----  Patient returning call please call her back at [phone]

## 2024-05-07 ENCOUNTER — TELEPHONE (OUTPATIENT)
Dept: GASTROENTEROLOGY | Facility: CLINIC | Age: 59
End: 2024-05-07

## 2024-05-07 ENCOUNTER — OFFICE VISIT (OUTPATIENT)
Dept: GASTROENTEROLOGY | Facility: CLINIC | Age: 59
End: 2024-05-07
Payer: MEDICAID

## 2024-05-07 VITALS
DIASTOLIC BLOOD PRESSURE: 69 MMHG | HEIGHT: 69 IN | DIASTOLIC BLOOD PRESSURE: 75 MMHG | HEART RATE: 96 BPM | SYSTOLIC BLOOD PRESSURE: 114 MMHG | OXYGEN SATURATION: 98 % | BODY MASS INDEX: 24.88 KG/M2 | WEIGHT: 168 LBS | SYSTOLIC BLOOD PRESSURE: 136 MMHG

## 2024-05-07 DIAGNOSIS — R19.7 DIARRHEA, UNSPECIFIED TYPE: Primary | ICD-10-CM

## 2024-05-07 DIAGNOSIS — Z86.010 HISTORY OF COLON POLYPS: ICD-10-CM

## 2024-05-07 DIAGNOSIS — K58.1 IRRITABLE BOWEL SYNDROME WITH CONSTIPATION: ICD-10-CM

## 2024-05-07 PROCEDURE — 3008F BODY MASS INDEX DOCD: CPT | Mod: CPTII,S$GLB,, | Performed by: INTERNAL MEDICINE

## 2024-05-07 PROCEDURE — 1160F RVW MEDS BY RX/DR IN RCRD: CPT | Mod: CPTII,S$GLB,, | Performed by: INTERNAL MEDICINE

## 2024-05-07 PROCEDURE — 1159F MED LIST DOCD IN RCRD: CPT | Mod: CPTII,S$GLB,, | Performed by: INTERNAL MEDICINE

## 2024-05-07 PROCEDURE — 99213 OFFICE O/P EST LOW 20 MIN: CPT | Mod: S$GLB,,, | Performed by: INTERNAL MEDICINE

## 2024-05-07 PROCEDURE — 3078F DIAST BP <80 MM HG: CPT | Mod: CPTII,S$GLB,, | Performed by: INTERNAL MEDICINE

## 2024-05-07 PROCEDURE — 3075F SYST BP GE 130 - 139MM HG: CPT | Mod: CPTII,S$GLB,, | Performed by: INTERNAL MEDICINE

## 2024-05-07 RX ORDER — POLYETHYLENE GLYCOL 3350, SODIUM SULFATE ANHYDROUS, SODIUM BICARBONATE, SODIUM CHLORIDE, POTASSIUM CHLORIDE 236; 22.74; 6.74; 5.86; 2.97 G/4L; G/4L; G/4L; G/4L; G/4L
8 POWDER, FOR SOLUTION ORAL ONCE
Qty: 8000 ML | Refills: 0 | Status: SHIPPED | OUTPATIENT
Start: 2024-05-07 | End: 2024-05-07

## 2024-05-07 NOTE — PATIENT INSTRUCTIONS
Schedule colonoscopy in 5/2025. You will need 2 days of clear liquid diet and 8 liters of Golytely.   Take stool softener daily.    Please notify my office if you have not been contacted within two weeks after any procedures, submitting any samples (biopsies, blood, stool, urine, etc.) or after any imaging (X-ray, CT, MRI, etc.).

## 2024-05-07 NOTE — LETTER
May 7, 2024        Nika Orellana, NP  [address]  [address]  [place] [address]             [facility]  [address] [address]  Phone: [phone]  Fax: [phone]   Patient: Elizabeth Gomez   MR Number: [id number]   YOB: 1965   Date of Visit: 5/7/2024       Dear Dr. Orellana:    Thank you for referring Elizabeth Gomez to me for evaluation. Below are the relevant portions of my assessment and plan of care.            If you have questions, please do not hesitate to call me. I look forward to following Elizabeth along with you.    Sincerely,      Zakia Palm MD           CC  No Recipients

## 2024-05-07 NOTE — PROGRESS NOTES
Clinic Note    Reason for visit:  The primary encounter diagnosis was Diarrhea, unspecified type. Diagnoses of Irritable bowel syndrome with constipation and History of colon polyps were also pertinent to this visit.    PCP: Nika Orellana       HPI:  This is a 58 y.o. female who is here for a follow up. Patient with IBS-C. Usually takes stool softener daily but not always consistent. She has had diarrhea for the past 4 days but today not as much. No blood in stool since 3/2023.   Linzess did not work.     Colonoscopy 5/4/2022: Prep (2 days of CLD and 8 L of golytely), long redundant colon, 3 TA, repeat colon w/adult colonoscope and agg prep (2d CLD, 8L GoLytely) in 3 years.     Colonoscopy 5/3/2022:  Solid stool in the rectum and distal sigmoid limiting advancement of endoscope. IH. Begin Linzess 290 mcg by mouth daily.  We will focus on daily soft easy bowel movements and then we will attempt a repeat colonoscopy with aggressive prep.      Colonoscopy 2/1/2022: stool throughout left colon, unable to visualize for advancement. Repeat colonoscopy with agg pre    Review of Systems   Constitutional:  Positive for diaphoresis. Negative for fatigue, fever and unexpected weight change.   HENT:  Positive for mouth sores. Negative for postnasal drip, sore throat and trouble swallowing.    Eyes:  Negative for pain, discharge and eye dryness.   Respiratory:  Positive for chest tightness, shortness of breath and wheezing. Negative for apnea, cough and choking.    Cardiovascular:  Negative for chest pain, palpitations and leg swelling.   Gastrointestinal:  Positive for constipation and diarrhea. Negative for abdominal distention, abdominal pain, anal bleeding, blood in stool, change in bowel habit, nausea, rectal pain, vomiting, reflux and fecal incontinence.   Genitourinary:  Positive for hematuria. Negative for bladder incontinence and dysuria.   Musculoskeletal:  Positive for back pain and joint swelling. Negative for  arthralgias.   Integumentary:  Negative for color change and rash.   Allergic/Immunologic: Negative for environmental allergies and food allergies.   Neurological:  Negative for seizures and headaches.   Hematological:  Negative for adenopathy. Bruises/bleeds easily.        Past Medical History:   Diagnosis Date    Anxiety     Cervical cancer     Generalized anxiety disorder 10/25/2022    Hepatic steatosis 06/27/2023    History of cervical cancer 1993    Personal history of colonic polyps     Pure hypercholesterolemia 10/26/2022    Uterine cancer      Past Surgical History:   Procedure Laterality Date    CARPAL TUNNEL RELEASE Right     COLONOSCOPY      ganglionectomy Right     Wrist    HAND SURGERY Right     HYSTERECTOMY      SURGICAL REMOVAL OF LESION OF ORBIT Right     TRIGGER FINGER RELEASE Right     Middle Finger     Family History   Problem Relation Name Age of Onset    Hypertension Mother      Hypertension Father      Melanoma Maternal Uncle      Lung cancer Maternal Uncle      Bone cancer Maternal Grandfather      Colon cancer Neg Hx      Esophageal cancer Neg Hx      Inflammatory bowel disease Neg Hx      Irritable bowel syndrome Neg Hx      Liver cancer Neg Hx      Rectal cancer Neg Hx      Stomach cancer Neg Hx      Ulcerative colitis Neg Hx       Social History     Tobacco Use    Smoking status: Every Day     Current packs/day: 0.50     Types: Cigarettes    Smokeless tobacco: Never   Substance Use Topics    Alcohol use: Yes     Comment: RARELY    Drug use: Never     Review of patient's allergies indicates:   Allergen Reactions    Clarithromycin     Codeine     Sulfa (sulfonamide antibiotics) Nausea And Vomiting        Medication List with Changes/Refills   New Medications    POLYETHYLENE GLYCOL (GOLYTELY) 236-22.74-6.74 -5.86 GRAM SUSPENSION    Take 8,000 mLs (8 L total) by mouth once. for 1 dose   Current Medications    ALPRAZOLAM (XANAX) 1 MG TABLET        ANORO ELLIPTA 62.5-25 MCG/ACTUATION DSDV     "INHALE 1 PUFF INTO THE LUNGS ONCE DAILY.    ATROVENT HFA 17 MCG/ACTUATION INHALER    INHALE 2 PUFFS BY MOUTH INTO THE LUNGS EVERY 6 (SIX) HOURS AS NEEDED FOR WHEEZING/ FOR SHORTNESS OF BREATH    DICLOFENAC SODIUM (VOLTAREN) 1 % GEL    Apply 2 g topically 4 (four) times daily.    QUETIAPINE (SEROQUEL) 400 MG TABLET    Take 400 mg by mouth once daily.    VALACYCLOVIR (VALTREX) 1000 MG TABLET    Take 1 tablet (1,000 mg total) by mouth 2 (two) times daily.    VENLAFAXINE (EFFEXOR-XR) 150 MG CP24    Take 150 mg by mouth once daily.   Discontinued Medications    BUPROPION (WELLBUTRIN XL) 150 MG TB24 TABLET    Take 1 tablet by mouth Daily.         Vital Signs:  /69 (BP Location: Left arm, Patient Position: Sitting)   Pulse 96   Ht 5' 9.25" (1.759 m)   Wt 76.2 kg (168 lb)   SpO2 98%   BMI 24.63 kg/m²         Physical Exam  Vitals reviewed.   Constitutional:       General: She is awake. She is not in acute distress.     Appearance: Normal appearance. She is well-developed. She is not ill-appearing, toxic-appearing or diaphoretic.   HENT:      Head: Normocephalic and atraumatic.      Nose: Nose normal.      Mouth/Throat:      Mouth: Mucous membranes are moist.      Pharynx: Oropharynx is clear. No oropharyngeal exudate or posterior oropharyngeal erythema.   Eyes:      General: Lids are normal. Gaze aligned appropriately. No scleral icterus.        Right eye: No discharge.         Left eye: No discharge.      Conjunctiva/sclera: Conjunctivae normal.   Neck:      Trachea: Trachea normal.   Cardiovascular:      Rate and Rhythm: Normal rate and regular rhythm.      Pulses:           Radial pulses are 2+ on the right side and 2+ on the left side.   Pulmonary:      Effort: Pulmonary effort is normal. No respiratory distress.      Breath sounds: No stridor. No wheezing.   Chest:      Chest wall: No tenderness.   Abdominal:      General: Bowel sounds are normal. There is no distension.      Palpations: Abdomen is soft. " There is no fluid wave, hepatomegaly or mass.      Tenderness: There is no abdominal tenderness. There is no guarding or rebound.   Musculoskeletal:         General: No tenderness or deformity.      Cervical back: Full passive range of motion without pain and neck supple. No tenderness.      Right lower leg: No edema.      Left lower leg: No edema.   Lymphadenopathy:      Cervical: No cervical adenopathy.   Skin:     General: Skin is warm and dry.      Capillary Refill: Capillary refill takes less than 2 seconds.      Coloration: Skin is not cyanotic, jaundiced or pale.   Neurological:      General: No focal deficit present.      Mental Status: She is alert and oriented to person, place, and time.      Motor: No tremor.   Psychiatric:         Attention and Perception: Attention normal.         Mood and Affect: Mood and affect normal.         Speech: Speech normal.         Behavior: Behavior normal. Behavior is cooperative.            All of the data above and below has been reviewed by myself and any further interpretations will be reflected in the assessment and plan.   The data includes review of external notes, and independent interpretation of lab results, procedures, x-rays, and imaging reports.      Assessment:  Diarrhea, unspecified type    Irritable bowel syndrome with constipation    History of colon polyps  -     Ambulatory Referral to External Surgery  -     polyethylene glycol (GOLYTELY) 236-22.74-6.74 -5.86 gram suspension; Take 8,000 mLs (8 L total) by mouth once. for 1 dose  Dispense: 8000 mL; Refill: 0    IBS-C: feels she is doing well with stool softener daily. Ran out. Linzess did not work well.   Colonoscopy due 5/2025 with aggr prep and 2 days CLD  Diarrhea for last 4 days. Offered stool tests to rule out infection and she declines for now.     Recommendations:  Schedule colonoscopy in 5/2025. You will need 2 days of clear liquid diet and 8 liters of Golytely.   Take stool softener  daily.    Risks, benefits, and alternatives of medical management, any associated procedures, and/or treatment discussed with the patient. Patient given opportunity to ask questions and voices understanding. Patient has elected to proceed with the recommended care modalities as discussed.    Instructed patient to notify my office if they have not been contacted within two weeks after any procedures, submitting any samples (biopsies, blood, stool, urine, etc.) or after any imaging (X-ray, CT, MRI, etc.).     Follow up in about 1 year (around 5/7/2025). With NP    Order summary:  Orders Placed This Encounter   Procedures    Ambulatory Referral to External Surgery      This assessment, plan, and documentation was performed in collaboration with Tierra Dunlap NP.     This document may have been created using a voice recognition transcribing system. Incorrect words or phrases may have been missed during proofreading. Please interpret accordingly or contact me for clarification.     Zakia Palm MD

## 2024-05-07 NOTE — TELEPHONE ENCOUNTER
Patient was given instructions and they were reviewed with patient. Patient was given AVS with apt details. 5/7/24 LRA

## 2024-05-31 DIAGNOSIS — M19.042 ARTHRITIS OF BOTH HANDS: Chronic | ICD-10-CM

## 2024-05-31 DIAGNOSIS — M19.041 ARTHRITIS OF BOTH HANDS: Chronic | ICD-10-CM

## 2024-06-03 RX ORDER — DICLOFENAC SODIUM 10 MG/G
2 GEL TOPICAL 4 TIMES DAILY
Qty: 350 G | Refills: 3 | Status: SHIPPED | OUTPATIENT
Start: 2024-06-03

## 2024-06-20 ENCOUNTER — PATIENT MESSAGE (OUTPATIENT)
Dept: FAMILY MEDICINE | Facility: CLINIC | Age: 59
End: 2024-06-20
Payer: MEDICAID

## 2024-06-28 DIAGNOSIS — R91.1 SOLITARY PULMONARY NODULE: Primary | ICD-10-CM

## 2024-07-18 ENCOUNTER — TELEPHONE (OUTPATIENT)
Dept: FAMILY MEDICINE | Facility: CLINIC | Age: 59
End: 2024-07-18
Payer: MEDICAID

## 2024-07-18 NOTE — TELEPHONE ENCOUNTER
Please call pt. Regarding CT scan and other lung studies she is saying she Is suppose to have done but she never did

## 2024-07-18 NOTE — TELEPHONE ENCOUNTER
----- Message from Desiree Brown sent at 7/18/2024  4:03 PM CDT -----  Contact: self  Type:  Patient Returning Call    Who Called:Elizabeth Gomez  Who Left Message for Patient:preet  Does the patient know what this is regarding?:virtual appt  Would the patient rather a call back or a response via [facility]?   Best Call Back Number:[phone]  Additional Information: n/a

## 2024-07-18 NOTE — TELEPHONE ENCOUNTER
Pt. Wants ref. To urology, previous pt. Of Dr. Clement Platt.  Does not want to see NP.    Pt. Also wants ref. To smoking cessation.      Pt. Also wants ref. To psyche for depression, anxiety.

## 2024-07-19 ENCOUNTER — TELEPHONE (OUTPATIENT)
Dept: PULMONOLOGY | Facility: CLINIC | Age: 59
End: 2024-07-19
Payer: MEDICAID

## 2024-07-19 NOTE — TELEPHONE ENCOUNTER
Call patient and let her that her CT was denied by insurance pt is reaching out to insurance regarding denial. LB

## 2024-09-13 ENCOUNTER — PATIENT MESSAGE (OUTPATIENT)
Dept: PRIMARY CARE CLINIC | Facility: CLINIC | Age: 59
End: 2024-09-13
Payer: MEDICAID

## 2024-09-30 ENCOUNTER — TELEPHONE (OUTPATIENT)
Dept: FAMILY MEDICINE | Facility: CLINIC | Age: 59
End: 2024-09-30
Payer: MEDICAID

## 2024-09-30 NOTE — TELEPHONE ENCOUNTER
----- Message from Leticia sent at 9/30/2024  3:47 PM CDT -----  Contact: pt  Pt returning a missed call and can be reached at [phone].  Pt using another phone because she lost her.      Thanks,

## 2024-09-30 NOTE — TELEPHONE ENCOUNTER
----- Message from Mimi sent at 9/30/2024 11:00 AM CDT -----  Patient is calling in regards to appointments please call her back at [phone]

## 2024-10-04 DIAGNOSIS — R91.1 LUNG NODULE: Primary | ICD-10-CM

## 2024-10-04 LAB — BCS RECOMMENDATION EXT: NORMAL

## 2024-10-07 ENCOUNTER — CLINICAL SUPPORT (OUTPATIENT)
Dept: FAMILY MEDICINE | Facility: CLINIC | Age: 59
End: 2024-10-07
Payer: MEDICAID

## 2024-10-07 VITALS
SYSTOLIC BLOOD PRESSURE: 110 MMHG | OXYGEN SATURATION: 98 % | DIASTOLIC BLOOD PRESSURE: 68 MMHG | TEMPERATURE: 99 F | HEART RATE: 94 BPM

## 2024-10-07 DIAGNOSIS — I10 ESSENTIAL HYPERTENSION: Primary | ICD-10-CM

## 2024-10-09 ENCOUNTER — TELEPHONE (OUTPATIENT)
Dept: FAMILY MEDICINE | Facility: CLINIC | Age: 59
End: 2024-10-09
Payer: MEDICAID

## 2024-10-16 ENCOUNTER — OFFICE VISIT (OUTPATIENT)
Dept: UROLOGY | Facility: CLINIC | Age: 59
End: 2024-10-16
Payer: MEDICAID

## 2024-10-16 ENCOUNTER — OFFICE VISIT (OUTPATIENT)
Dept: FAMILY MEDICINE | Facility: CLINIC | Age: 59
End: 2024-10-16
Payer: MEDICAID

## 2024-10-16 VITALS
RESPIRATION RATE: 16 BRPM | SYSTOLIC BLOOD PRESSURE: 120 MMHG | TEMPERATURE: 98 F | WEIGHT: 164 LBS | HEART RATE: 74 BPM | DIASTOLIC BLOOD PRESSURE: 72 MMHG | BODY MASS INDEX: 24.29 KG/M2 | OXYGEN SATURATION: 98 % | HEIGHT: 69 IN

## 2024-10-16 VITALS
HEIGHT: 69 IN | DIASTOLIC BLOOD PRESSURE: 60 MMHG | BODY MASS INDEX: 24.29 KG/M2 | RESPIRATION RATE: 18 BRPM | SYSTOLIC BLOOD PRESSURE: 100 MMHG | WEIGHT: 164 LBS | OXYGEN SATURATION: 97 % | HEART RATE: 68 BPM

## 2024-10-16 DIAGNOSIS — E78.2 MIXED HYPERLIPIDEMIA: Primary | Chronic | ICD-10-CM

## 2024-10-16 DIAGNOSIS — K76.0 HEPATIC STEATOSIS: Chronic | ICD-10-CM

## 2024-10-16 DIAGNOSIS — Z23 FLU VACCINE NEED: ICD-10-CM

## 2024-10-16 DIAGNOSIS — F17.210 CIGARETTE NICOTINE DEPENDENCE WITHOUT COMPLICATION: Chronic | ICD-10-CM

## 2024-10-16 DIAGNOSIS — Z12.31 BREAST CANCER SCREENING BY MAMMOGRAM: ICD-10-CM

## 2024-10-16 DIAGNOSIS — Z23 NEED FOR DIPHTHERIA-TETANUS-PERTUSSIS (TDAP) VACCINE: ICD-10-CM

## 2024-10-16 DIAGNOSIS — R82.90 ABNORMAL URINALYSIS: Primary | ICD-10-CM

## 2024-10-16 LAB
ABS NRBC COUNT: 0 THOU/UL (ref 0–0.01)
ABSOLUTE BASOPHIL: 0 10*3/UL (ref 0–0.3)
ABSOLUTE EOSINOPHIL: 0.1 10*3/UL (ref 0–0.6)
ABSOLUTE IMMATURE GRAN: 0.07 THOU/UL (ref 0–0.03)
ABSOLUTE LYMPHOCYTE: 2.4 10*3/UL (ref 1.2–4)
ABSOLUTE MONOCYTE: 0.3 10*3/UL (ref 0.1–0.8)
ALBUMIN SERPL BCP-MCNC: 3.5 G/DL (ref 3.4–5)
ALBUMIN/GLOBULIN RATIO: 1 RATIO (ref 1.1–1.8)
ALP SERPL-CCNC: 102 U/L (ref 45–117)
ALT SERPL W P-5'-P-CCNC: 20 U/L (ref 13–56)
ANION GAP SERPL CALC-SCNC: 4 MMOL/L (ref 3–11)
AST SERPL-CCNC: 13 U/L (ref 15–37)
BASOPHILS NFR BLD: 0.4 % (ref 0–3)
BILIRUB SERPL-MCNC: 0.1 MG/DL (ref 0.2–1)
BILIRUBIN, UA POC OHS: NEGATIVE
BLOOD, UA POC OHS: ABNORMAL
BUN SERPL-MCNC: 20 MG/DL (ref 7–18)
BUN/CREAT SERPL: 21.5 RATIO
CALCIUM SERPL-MCNC: 8.7 MG/DL (ref 8.5–10.1)
CHLORIDE SERPL-SCNC: 103 MMOL/L (ref 98–107)
CHOLEST SERPL-MSCNC: 224 MG/DL
CLARITY, UA POC OHS: CLEAR
CO2 SERPL-SCNC: 29 MMOL/L (ref 21–32)
COLOR, UA POC OHS: YELLOW
CREAT SERPL-MCNC: 0.93 MG/DL (ref 0.55–1.02)
EOSINOPHIL NFR BLD: 1.2 % (ref 0–6)
ERYTHROCYTE [DISTWIDTH] IN BLOOD BY AUTOMATED COUNT: 13.9 % (ref 0–15.5)
GFR ESTIMATION: > 60
GLOBULIN: 3.5 G/DL (ref 2.3–3.5)
GLUCOSE SERPL-MCNC: 80 MG/DL (ref 74–106)
GLUCOSE, UA POC OHS: NEGATIVE
HCT VFR BLD AUTO: 41.5 % (ref 37–47)
HDL/CHOLESTEROL RATIO: 3.1 RATIO
HDLC SERPL-MCNC: 73 MG/DL
HGB BLD-MCNC: 13.8 G/DL (ref 12–16)
IMMATURE GRANULOCYTES: 0.9 % (ref 0–0.43)
KETONES, UA POC OHS: ABNORMAL
LDLC SERPL CALC-MCNC: 124 MG/DL
LEUKOCYTES, UA POC OHS: NEGATIVE
LYMPHOCYTES NFR BLD: 29.1 % (ref 20–45)
MCH RBC QN AUTO: 31.1 PG (ref 27–32)
MCHC RBC AUTO-ENTMCNC: 33.3 % (ref 32–36)
MCV RBC AUTO: 93.5 FL (ref 80–99)
MONOCYTES NFR BLD: 3.9 % (ref 2–10)
NEUTROPHILS # BLD AUTO: 5.3 10*3/UL (ref 1.4–7)
NEUTROPHILS NFR BLD: 64.5 % (ref 50–80)
NITRITE, UA POC OHS: NEGATIVE
NUCLEATED RED BLOOD CELLS: 0 % (ref 0–0.2)
PH, UA POC OHS: 6
PLATELETS: 341 10*3/UL (ref 130–400)
PMV BLD AUTO: 10.5 FL (ref 9.2–12.2)
POTASSIUM SERPL-SCNC: 3.9 MMOL/L (ref 3.5–5.1)
PROT SERPL-MCNC: 7 G/DL (ref 6.4–8.2)
PROTEIN, UA POC OHS: NEGATIVE
RBC # BLD AUTO: 4.44 10*6/UL (ref 4.2–5.4)
SODIUM BLD-SCNC: 136 MMOL/L (ref 131–143)
SPECIFIC GRAVITY, UA POC OHS: >=1.03
T4 FREE SP9 P CHAL SERPL-SCNC: 0.6 NG/DL (ref 0.76–1.46)
TRIGL SERPL-MCNC: 135 MG/DL (ref 0–149)
TSH SERPL DL<=0.005 MIU/L-ACNC: 1.82 UIU/ML (ref 0.36–3.74)
UROBILINOGEN, UA POC OHS: 0.2
VLDL CHOLESTEROL: 27 MG/DL
WBC # BLD: 8.2 10*3/UL (ref 4.5–10)

## 2024-10-16 PROCEDURE — 3074F SYST BP LT 130 MM HG: CPT | Mod: CPTII,S$GLB,,

## 2024-10-16 PROCEDURE — 99214 OFFICE O/P EST MOD 30 MIN: CPT | Mod: S$GLB,,,

## 2024-10-16 PROCEDURE — 81003 URINALYSIS AUTO W/O SCOPE: CPT | Mod: QW,S$GLB,,

## 2024-10-16 PROCEDURE — 3078F DIAST BP <80 MM HG: CPT | Mod: CPTII,S$GLB,,

## 2024-10-16 PROCEDURE — 1160F RVW MEDS BY RX/DR IN RCRD: CPT | Mod: CPTII,S$GLB,,

## 2024-10-16 PROCEDURE — 1159F MED LIST DOCD IN RCRD: CPT | Mod: CPTII,S$GLB,, | Performed by: NURSE PRACTITIONER

## 2024-10-16 PROCEDURE — 1160F RVW MEDS BY RX/DR IN RCRD: CPT | Mod: CPTII,S$GLB,, | Performed by: NURSE PRACTITIONER

## 2024-10-16 PROCEDURE — 3008F BODY MASS INDEX DOCD: CPT | Mod: CPTII,S$GLB,, | Performed by: NURSE PRACTITIONER

## 2024-10-16 PROCEDURE — 3008F BODY MASS INDEX DOCD: CPT | Mod: CPTII,S$GLB,,

## 2024-10-16 PROCEDURE — 3078F DIAST BP <80 MM HG: CPT | Mod: CPTII,S$GLB,, | Performed by: NURSE PRACTITIONER

## 2024-10-16 PROCEDURE — 3074F SYST BP LT 130 MM HG: CPT | Mod: CPTII,S$GLB,, | Performed by: NURSE PRACTITIONER

## 2024-10-16 PROCEDURE — 1159F MED LIST DOCD IN RCRD: CPT | Mod: CPTII,S$GLB,,

## 2024-10-16 PROCEDURE — 99214 OFFICE O/P EST MOD 30 MIN: CPT | Mod: 25,S$GLB,, | Performed by: NURSE PRACTITIONER

## 2024-10-16 RX ORDER — NYSTATIN 100000 [USP'U]/ML
4 SUSPENSION ORAL 4 TIMES DAILY
COMMUNITY
Start: 2024-04-25

## 2024-10-16 NOTE — PROGRESS NOTES
Subjective:       Patient ID: Elizabeth Gomez is a 59 y.o. female.    Chief Complaint: Follow-up (Pt is here for a follow up, brought in home cuff for comparison. Numbers are within range with manual reading. Pt has recently seen urology. )     She lives in [place]. She is working on and off--she does [employer]. She is single. She smokes approx 1/2 ppd. She has tried hypnosis in the past which did help lessened frequency of smoking.   Medical problems include generalized anxiety disorder, uterine/cervical cancer, HLD, DDD cervical spine, DDD lumbar spine, chronic low back pain, and OA mainly affecting hands. Her mental health specialist is Cheikh Cuevas. Her gastroenterologist is Dr Zakia Palm. Currently being urology for surveillance of hematuria.      Hyperlipidemia    Type of hyperlipidemia: combined  Duration: chronic  Control: usually well controlled; at goal  Compliance: compliant; compliant with diet; exercises  Complications: no coronary artery disease; no cerebral vascular disease, no peripheral artery disease  ASCVD:The 10-year ASCVD risk score (Luis YING, et al., 2019) is: 5.3%    Values used to calculate the score:      Age: 59 years      Sex: Female      Is Non- : No      Diabetic: No      Tobacco smoker: Yes      Systolic Blood Pressure: 120 mmHg      Is BP treated: No      HDL Cholesterol: 80 mg/dL      Total Cholesterol: 206 mg/dL         Review of Systems   Constitutional:  Negative for chills and fever.   Respiratory:  Negative for cough and wheezing.    Cardiovascular:  Negative for chest pain and palpitations.   Gastrointestinal:  Negative for abdominal pain, nausea and vomiting.   Genitourinary:  Positive for dysuria (urology obtained UA reflex C&S today). Negative for frequency and urgency.   Musculoskeletal:  Negative for arthralgias.   Neurological:  Negative for dizziness, light-headedness and headaches.           Past Medical History:  Past Medical History:    Diagnosis Date    Anxiety     Cervical cancer     Generalized anxiety disorder 10/25/2022    Hepatic steatosis 06/27/2023    History of cervical cancer 1993    Personal history of colonic polyps     Pure hypercholesterolemia 10/26/2022    Uterine cancer       Past Surgical History:   Procedure Laterality Date    CARPAL TUNNEL RELEASE Right     COLONOSCOPY      ganglionectomy Right     Wrist    HAND SURGERY Right     HYSTERECTOMY      SURGICAL REMOVAL OF LESION OF ORBIT Right     TRIGGER FINGER RELEASE Right     Middle Finger      Review of patient's allergies indicates:   Allergen Reactions    Chantix [varenicline] Anxiety and Other (See Comments)     irritation    Clarithromycin     Codeine     Sulfa (sulfonamide antibiotics) Nausea And Vomiting      Current Outpatient Medications   Medication Sig Dispense Refill    nystatin (MYCOSTATIN) 100,000 unit/mL suspension Take 4 mLs by mouth 4 (four) times daily.      ALPRAZolam (XANAX) 1 MG tablet       ANORO ELLIPTA 62.5-25 mcg/actuation DsDv INHALE 1 PUFF INTO THE LUNGS ONCE DAILY. 60 each 4    ATROVENT HFA 17 mcg/actuation inhaler INHALE 2 PUFFS BY MOUTH INTO THE LUNGS EVERY 6 (SIX) HOURS AS NEEDED FOR WHEEZING/ FOR SHORTNESS OF BREATH 12.9 g 0    diclofenac sodium (VOLTAREN) 1 % Gel Apply 2 g topically 4 (four) times daily. (Patient taking differently: Apply 2 g topically 4 (four) times daily. As needed) 350 g 3    QUEtiapine (SEROQUEL) 400 MG tablet Take 400 mg by mouth once daily.      valACYclovir (VALTREX) 1000 MG tablet Take 1 tablet (1,000 mg total) by mouth 2 (two) times daily. (Patient taking differently: Take 1,000 mg by mouth 2 (two) times daily. As needed) 14 tablet 3    venlafaxine (EFFEXOR-XR) 150 MG Cp24 Take 150 mg by mouth once daily.       Current Facility-Administered Medications   Medication Dose Route Frequency Provider Last Rate Last Admin    (VFC) influenza (Flulaval, Fluzone, Fluarix) 45 mcg/0.5 mL IM vaccine (> or = 6 mo) 0.5 mL  0.5 mL  Intramuscular 1 time in Clinic/HOD         VFC-Tdap (ADACEL) vaccine 0.5 mL  0.5 mL Intramuscular 1 time in Clinic/HOD          Social History     Socioeconomic History    Marital status: Single   Tobacco Use    Smoking status: Every Day     Current packs/day: 0.50     Types: Cigarettes    Smokeless tobacco: Never   Substance and Sexual Activity    Alcohol use: Yes     Comment: RARELY    Drug use: Never    Sexual activity: Not Currently     Social Drivers of Health     Financial Resource Strain: Medium Risk (4/8/2024)    Overall Financial Resource Strain (CARDIA)     Difficulty of Paying Living Expenses: Somewhat hard   Food Insecurity: No Food Insecurity (4/8/2024)    Hunger Vital Sign     Worried About Running Out of Food in the Last Year: Never true     Ran Out of Food in the Last Year: Never true   Transportation Needs: Unknown (4/8/2024)    PRAPARE - Transportation     Lack of Transportation (Medical): No     Lack of Transportation (Non-Medical): Patient declined   Physical Activity: Unknown (4/8/2024)    Exercise Vital Sign     Days of Exercise per Week: Patient declined     Minutes of Exercise per Session: 120 min   Stress: Stress Concern Present (4/8/2024)    [employer] - Occupational Stress Questionnaire     Feeling of Stress : To some extent   Housing Stability: High Risk (4/8/2024)    Housing Stability Vital Sign     Unable to Pay for Housing in the Last Year: Yes     Number of Places Lived in the Last Year: 2     Unstable Housing in the Last Year: No      Family History   Problem Relation Name Age of Onset    Hypertension Mother      Hypertension Father      Melanoma Maternal Uncle      Lung cancer Maternal Uncle      Bone cancer Maternal Grandfather      Colon cancer Neg Hx      Esophageal cancer Neg Hx      Inflammatory bowel disease Neg Hx      Irritable bowel syndrome Neg Hx      Liver cancer Neg Hx      Rectal cancer Neg Hx      Stomach cancer Neg Hx      Ulcerative  colitis Neg Hx          Objective:      Physical Exam  Constitutional:       Appearance: Normal appearance.   Eyes:      General: No scleral icterus.     Pupils: Pupils are equal, round, and reactive to light.   Cardiovascular:      Rate and Rhythm: Normal rate and regular rhythm.   Pulmonary:      Effort: Pulmonary effort is normal.      Breath sounds: Normal breath sounds.   Skin:     Coloration: Skin is not jaundiced.   Neurological:      Mental Status: She is alert and oriented to person, place, and time.   Psychiatric:         Mood and Affect: Mood normal.         Behavior: Behavior normal.         Assessment:     1. Mixed hyperlipidemia Stable   2. Hepatic steatosis Stable   3. Cigarette nicotine dependence without complication Active   4. Need for diphtheria-tetanus-pertussis (Tdap) vaccine    5. Flu vaccine need    6. Breast cancer screening by mammogram      Plan:       PROBLEM LIST     Mixed hyperlipidemia  Comments:  Obtaining FLP today; Previous ASCVD score 5.3%  Orders:  -     CBC Auto Differential  -     Comprehensive Metabolic Panel  -     Lipid Panel  -     TSH w/reflex to FT4    Hepatic steatosis  Comments:  checking LEs; may need to avoid statins    Cigarette nicotine dependence without complication  Comments:  discussed effects smoking has on her health. Declines cessation measures at present time.    Need for diphtheria-tetanus-pertussis (Tdap) vaccine  -     VFC-Tdap (ADACEL) vaccine 0.5 mL    Flu vaccine need  -     (VFC) influenza (Flulaval, Fluzone, Fluarix) 45 mcg/0.5 mL IM vaccine (> or = 6 mo) 0.5 mL    Breast cancer screening by mammogram

## 2024-10-16 NOTE — PROGRESS NOTES
Subjective:       Patient ID: Elizabeth Gomez is a 59 y.o. female.    Chief Complaint: No chief complaint on file.      HPI: 59-year-old female established patient presents today for yearly visit.  Patient has a history of microscopic hematuria and underwent a CT and cystoscopy in March of last year.  Both were negative for any urological conditions contributing to her hematuria.  Patient presents today reporting that she has not had any urinary issues in the last year.  She denies all lower urinary tract symptoms today.  She does report that she has been having some intermittent back pain after a fall.  Her urinalysis today is abnormal however she is asymptomatic       Past Medical History:   Past Medical History:   Diagnosis Date    Anxiety     Cervical cancer     Generalized anxiety disorder 10/25/2022    Hepatic steatosis 06/27/2023    History of cervical cancer 1993    Personal history of colonic polyps     Pure hypercholesterolemia 10/26/2022    Uterine cancer        Past Surgical Historical:   Past Surgical History:   Procedure Laterality Date    CARPAL TUNNEL RELEASE Right     COLONOSCOPY      ganglionectomy Right     Wrist    HAND SURGERY Right     HYSTERECTOMY      SURGICAL REMOVAL OF LESION OF ORBIT Right     TRIGGER FINGER RELEASE Right     Middle Finger        Medications:   Medication List with Changes/Refills   Current Medications    ALPRAZOLAM (XANAX) 1 MG TABLET        ANORO ELLIPTA 62.5-25 MCG/ACTUATION DSDV    INHALE 1 PUFF INTO THE LUNGS ONCE DAILY.    ATROVENT HFA 17 MCG/ACTUATION INHALER    INHALE 2 PUFFS BY MOUTH INTO THE LUNGS EVERY 6 (SIX) HOURS AS NEEDED FOR WHEEZING/ FOR SHORTNESS OF BREATH    DICLOFENAC SODIUM (VOLTAREN) 1 % GEL    Apply 2 g topically 4 (four) times daily.    QUETIAPINE (SEROQUEL) 400 MG TABLET    Take 400 mg by mouth once daily.    VALACYCLOVIR (VALTREX) 1000 MG TABLET    Take 1 tablet (1,000 mg total) by mouth 2 (two) times daily.    VENLAFAXINE (EFFEXOR-XR) 150 MG CP24     Take 150 mg by mouth once daily.        Past Social History:   Social History     Socioeconomic History    Marital status: Single   Tobacco Use    Smoking status: Every Day     Current packs/day: 0.50     Types: Cigarettes    Smokeless tobacco: Never   Substance and Sexual Activity    Alcohol use: Yes     Comment: RARELY    Drug use: Never    Sexual activity: Not Currently     Social Drivers of Health     Financial Resource Strain: Medium Risk (4/8/2024)    Overall Financial Resource Strain (CARDIA)     Difficulty of Paying Living Expenses: Somewhat hard   Food Insecurity: No Food Insecurity (4/8/2024)    Hunger Vital Sign     Worried About Running Out of Food in the Last Year: Never true     Ran Out of Food in the Last Year: Never true   Transportation Needs: Unknown (4/8/2024)    PRAPARE - Transportation     Lack of Transportation (Medical): No     Lack of Transportation (Non-Medical): Patient declined   Physical Activity: Unknown (4/8/2024)    Exercise Vital Sign     Days of Exercise per Week: Patient declined     Minutes of Exercise per Session: 120 min   Stress: Stress Concern Present (4/8/2024)    [employer] - Occupational Stress Questionnaire     Feeling of Stress : To some extent   Housing Stability: High Risk (4/8/2024)    Housing Stability Vital Sign     Unable to Pay for Housing in the Last Year: Yes     Number of Places Lived in the Last Year: 2     Unstable Housing in the Last Year: No       Allergies:   Review of patient's allergies indicates:   Allergen Reactions    Clarithromycin     Codeine     Sulfa (sulfonamide antibiotics) Nausea And Vomiting        Family History:   Family History   Problem Relation Name Age of Onset    Hypertension Mother      Hypertension Father      Melanoma Maternal Uncle      Lung cancer Maternal Uncle      Bone cancer Maternal Grandfather      Colon cancer Neg Hx      Esophageal cancer Neg Hx      Inflammatory bowel disease Neg Hx       Irritable bowel syndrome Neg Hx      Liver cancer Neg Hx      Rectal cancer Neg Hx      Stomach cancer Neg Hx      Ulcerative colitis Neg Hx          Review of Systems:  Review of Systems   Constitutional:  Negative for activity change, appetite change, chills, diaphoresis, fatigue, fever and unexpected weight change.   HENT:  Negative for congestion, dental problem, drooling, ear discharge, ear pain, facial swelling, hearing loss, mouth sores, nosebleeds, postnasal drip, rhinorrhea, sinus pressure, sinus pain, sneezing, sore throat, tinnitus, trouble swallowing and voice change.    Eyes:  Negative for photophobia, pain, discharge, redness, itching and visual disturbance.   Respiratory:  Negative for apnea, cough, choking, chest tightness, shortness of breath, wheezing and stridor.    Cardiovascular:  Negative for chest pain and leg swelling.   Gastrointestinal:  Negative for abdominal distention, abdominal pain, anal bleeding, blood in stool, constipation, diarrhea, nausea, rectal pain and vomiting.   Endocrine: Negative for cold intolerance, heat intolerance, polydipsia, polyphagia and polyuria.   Genitourinary: Negative.  Negative for decreased urine volume, difficulty urinating, dyspareunia, dysuria, enuresis, flank pain, frequency, genital sores, hematuria, menstrual problem, pelvic pain, urgency, vaginal bleeding, vaginal discharge and vaginal pain.   Musculoskeletal:  Negative for arthralgias, back pain, gait problem, joint swelling, myalgias, neck pain and neck stiffness.   Skin:  Negative for color change, pallor, rash and wound.   Allergic/Immunologic: Negative for environmental allergies, food allergies and immunocompromised state.   Neurological:  Negative for dizziness, tremors, seizures, syncope, facial asymmetry, speech difficulty, weakness, light-headedness, numbness and headaches.   Hematological:  Negative for adenopathy. Does not bruise/bleed easily.   Psychiatric/Behavioral:  Negative for  agitation, behavioral problems, confusion, decreased concentration, dysphoric mood, hallucinations, self-injury, sleep disturbance and suicidal ideas. The patient is not nervous/anxious and is not hyperactive.      Physical Exam:  Physical Exam  Cardiovascular:      Rate and Rhythm: Normal rate.   Pulmonary:      Effort: Pulmonary effort is normal.   Abdominal:      General: Abdomen is flat. Bowel sounds are normal.      Palpations: Abdomen is soft.   Neurological:      Mental Status: She is alert and oriented to person, place, and time.   Urinalysis: WBCs negative, RBCs 0-3 trace, epithelial 3+, bacteria 4+, mucus 2+, nitrite negative  Assessment/Plan:     Abnormal urinalysis:  Patient has some microscopic hematuria however she had a negative workup a year ago.  She is complaining of some intermittent back pain that has worsened over the last few days so we will send her urine for culture and treat if indicated.    Follow up as needed  Problem List Items Addressed This Visit    None  Visit Diagnoses       Abnormal urinalysis    -  Primary    Relevant Orders    POCT Urinalysis(Instrument)

## 2024-10-18 LAB — URINE CULTURE, ROUTINE: NORMAL

## 2024-10-21 ENCOUNTER — TELEPHONE (OUTPATIENT)
Dept: UROLOGY | Facility: CLINIC | Age: 59
End: 2024-10-21
Payer: MEDICAID

## 2024-10-21 DIAGNOSIS — E78.2 MIXED HYPERLIPIDEMIA: Primary | ICD-10-CM

## 2024-10-21 DIAGNOSIS — J43.2 CENTRILOBULAR EMPHYSEMA: ICD-10-CM

## 2024-10-21 RX ORDER — ATORVASTATIN CALCIUM 40 MG/1
40 TABLET, FILM COATED ORAL DAILY
Qty: 90 TABLET | Refills: 3 | Status: SHIPPED | OUTPATIENT
Start: 2024-10-21 | End: 2025-10-21

## 2024-10-21 NOTE — TELEPHONE ENCOUNTER
Notified patient of no growth on urine culture & to follow up as needed. Verbalized understanding.         ----- Message from Gely Motley NP sent at 10/21/2024  8:09 AM CDT -----  Please call and inform patient her urine culture showed no growth.  Follow up as needed

## 2024-10-22 ENCOUNTER — TELEPHONE (OUTPATIENT)
Dept: FAMILY MEDICINE | Facility: CLINIC | Age: 59
End: 2024-10-22
Payer: MEDICAID

## 2024-10-25 RX ORDER — IPRATROPIUM BROMIDE 17 UG/1
AEROSOL, METERED RESPIRATORY (INHALATION)
Qty: 12.9 G | Refills: 0 | Status: SHIPPED | OUTPATIENT
Start: 2024-10-25

## 2024-11-21 ENCOUNTER — TELEPHONE (OUTPATIENT)
Dept: FAMILY MEDICINE | Facility: CLINIC | Age: 59
End: 2024-11-21
Payer: MEDICAID

## 2024-11-21 NOTE — TELEPHONE ENCOUNTER
----- Message from Desiree sent at 11/21/2024 11:29 AM CST -----  Contact: self  Type:  Patient Returning Call    Who Called:Elizabeth Gomez  Who Left Message for Patient:unsure  Does the patient know what this is regarding?:appt  Would the patient rather a call back or a response via [facility]?   Best Call Back Number:[phone]  Additional Information: n/a

## 2024-11-25 DIAGNOSIS — R91.1 LUNG NODULE: Primary | ICD-10-CM

## 2024-11-26 ENCOUNTER — TELEPHONE (OUTPATIENT)
Dept: FAMILY MEDICINE | Facility: CLINIC | Age: 59
End: 2024-11-26
Payer: MEDICAID

## 2024-11-26 NOTE — TELEPHONE ENCOUNTER
"Advised pt.  To go to nearest Urgent Care for eval and tx.  Pt. Verbalized understanding.        Pt. Is wanting a different cholesterol med due to "yucky feeling" from Atorvastatin.  "

## 2024-11-26 NOTE — TELEPHONE ENCOUNTER
----- Message from Kassie sent at 11/26/2024  2:42 PM CST -----  Regarding: Medication and Resutls  Contact: patient  Per phone call with patient, she stated that she would like for medication to be called out to the pharmacy for cough and the request medication that she was on she only took it for three days and she was sick.  Please return call at [phone].    Type:  Test Results    Who Called: Elizabeth   Name of Test (Lab/Mammo/Etc):  Blood Work  Date of Test:  10/16/2024  Ordering Provider: Nika Orellana   Where the test was performed: in the office   Would the patient rather a call back or a response via [facility]?  Call back   Best Call Back Number: [phone]      Additional Information:  the caller is needing her number for sugar level, HDL and LDL    Thanks,  LYNSEY

## 2024-11-29 ENCOUNTER — TELEPHONE (OUTPATIENT)
Dept: PULMONOLOGY | Facility: CLINIC | Age: 59
End: 2024-11-29
Payer: MEDICAID

## 2024-11-29 NOTE — TELEPHONE ENCOUNTER
----- Message from Kassie sent at 11/29/2024 10:40 AM CST -----  Regarding: Results  Contact: Leticia, mother  Type:  Test Results    Who Called: Leticia  Name of Test (Lab/Mammo/Etc): CT Scan of Lungs   Date of Test: 11/25/2024   Ordering Provider: Brii Philip   Where the test was performed: [facility]  Would the patient rather a call back or a response via [facility]? Call back   Best Call Back Number:   [phone]  Additional Information:      Camilla,  LYNSEY

## 2024-12-12 ENCOUNTER — PATIENT MESSAGE (OUTPATIENT)
Dept: FAMILY MEDICINE | Facility: CLINIC | Age: 59
End: 2024-12-12
Payer: MEDICAID

## 2024-12-12 DIAGNOSIS — E78.2 MIXED HYPERLIPIDEMIA: Primary | ICD-10-CM

## 2024-12-12 RX ORDER — ROSUVASTATIN CALCIUM 20 MG/1
20 TABLET, COATED ORAL DAILY
Qty: 90 TABLET | Refills: 3 | Status: SHIPPED | OUTPATIENT
Start: 2024-12-12 | End: 2025-12-12

## 2025-01-10 DIAGNOSIS — J43.2 CENTRILOBULAR EMPHYSEMA: ICD-10-CM

## 2025-01-15 RX ORDER — UMECLIDINIUM BROMIDE AND VILANTEROL TRIFENATATE 62.5; 25 UG/1; UG/1
1 POWDER RESPIRATORY (INHALATION)
Qty: 60 EACH | Refills: 3 | Status: SHIPPED | OUTPATIENT
Start: 2025-01-15

## 2025-01-15 RX ORDER — IPRATROPIUM BROMIDE 17 UG/1
AEROSOL, METERED RESPIRATORY (INHALATION)
Qty: 12.9 G | Refills: 0 | Status: SHIPPED | OUTPATIENT
Start: 2025-01-15

## 2025-01-27 ENCOUNTER — TELEPHONE (OUTPATIENT)
Dept: FAMILY MEDICINE | Facility: CLINIC | Age: 60
End: 2025-01-27
Payer: MEDICAID

## 2025-01-27 DIAGNOSIS — E78.2 MIXED HYPERLIPIDEMIA: ICD-10-CM

## 2025-01-27 RX ORDER — ROSUVASTATIN CALCIUM 20 MG/1
20 TABLET, COATED ORAL DAILY
Qty: 90 TABLET | Refills: 3 | Status: CANCELLED | OUTPATIENT
Start: 2025-01-27 | End: 2026-01-27

## 2025-01-27 NOTE — TELEPHONE ENCOUNTER
----- Message from Leticia sent at 1/27/2025  1:57 PM CST -----  Contact: pt  Pt calling about script for rosuvastatin (CRESTOR) 20 MG wanting to know if she needs to come in for blood work to see if medication working.  Pt can be reached at [phone] th

## 2025-02-24 ENCOUNTER — TELEPHONE (OUTPATIENT)
Dept: PULMONOLOGY | Facility: CLINIC | Age: 60
End: 2025-02-24
Payer: MEDICAID

## 2025-02-24 NOTE — TELEPHONE ENCOUNTER
Unable to reach patient when returning call.    HEATHER 2/24/25 @5:07pm    ----- Message from Vicki sent at 2/24/2025  4:24 PM CST -----  Contact: MILIND CLEEMNTS [[id number]]  ..Type:  Patient Requesting CallWho Called: MILIND CLEMENTS [[id number]]What is the call regarding?: returning call concerning appt.Would the patient rather a call back or a response via [facility]?  callBe Call Back Number: [phone] Information:

## 2025-03-10 DIAGNOSIS — R91.1 LEFT LOWER LOBE PULMONARY NODULE: Primary | ICD-10-CM

## 2025-05-05 ENCOUNTER — TELEPHONE (OUTPATIENT)
Dept: GASTROENTEROLOGY | Facility: CLINIC | Age: 60
End: 2025-05-05

## 2025-05-05 NOTE — TELEPHONE ENCOUNTER
Patient had an apt today. Patient needed to dashawn. Patient is now sayda for 5/12/25 @ 2:00. Patient is aware of date and time. 5/5/25 lra       ---- Message from Bundle It sent at 5/5/2025 12:56 PM CDT -----  Contact: self [phone]  Type:  Needs appt rescheduled Who Called: Elizabeth Symptoms (please be specific): appt rescheduled  Would the patient rather a call back or a response via [facility]? Call back Best Call Back Number: [phone] Information:

## 2025-05-06 DIAGNOSIS — J43.2 CENTRILOBULAR EMPHYSEMA: ICD-10-CM

## 2025-05-06 RX ORDER — UMECLIDINIUM BROMIDE AND VILANTEROL TRIFENATATE 62.5; 25 UG/1; UG/1
1 POWDER RESPIRATORY (INHALATION)
Qty: 60 EACH | Refills: 5 | Status: SHIPPED | OUTPATIENT
Start: 2025-05-06

## 2025-05-12 ENCOUNTER — OFFICE VISIT (OUTPATIENT)
Dept: GASTROENTEROLOGY | Facility: CLINIC | Age: 60
End: 2025-05-12
Payer: MEDICAID

## 2025-05-12 ENCOUNTER — TELEPHONE (OUTPATIENT)
Dept: GASTROENTEROLOGY | Facility: CLINIC | Age: 60
End: 2025-05-12

## 2025-05-12 VITALS
HEIGHT: 69 IN | OXYGEN SATURATION: 98 % | SYSTOLIC BLOOD PRESSURE: 102 MMHG | HEART RATE: 91 BPM | BODY MASS INDEX: 26.31 KG/M2 | WEIGHT: 177.63 LBS | DIASTOLIC BLOOD PRESSURE: 72 MMHG

## 2025-05-12 DIAGNOSIS — K58.1 IRRITABLE BOWEL SYNDROME WITH CONSTIPATION: Primary | ICD-10-CM

## 2025-05-12 DIAGNOSIS — Z86.0100 HISTORY OF COLON POLYPS: ICD-10-CM

## 2025-05-12 PROCEDURE — 1159F MED LIST DOCD IN RCRD: CPT | Mod: CPTII,,,

## 2025-05-12 PROCEDURE — 3008F BODY MASS INDEX DOCD: CPT | Mod: CPTII,,,

## 2025-05-12 PROCEDURE — 99213 OFFICE O/P EST LOW 20 MIN: CPT | Mod: S$PBB,,,

## 2025-05-12 PROCEDURE — 3078F DIAST BP <80 MM HG: CPT | Mod: CPTII,,,

## 2025-05-12 PROCEDURE — 3074F SYST BP LT 130 MM HG: CPT | Mod: CPTII,,,

## 2025-05-12 PROCEDURE — 1160F RVW MEDS BY RX/DR IN RCRD: CPT | Mod: CPTII,,,

## 2025-05-12 NOTE — PROGRESS NOTES
Clinic Note    Reason for visit:  The primary encounter diagnosis was Irritable bowel syndrome with constipation. A diagnosis of History of colon polyps was also pertinent to this visit.    PCP: Nika Orellana       HPI:  This is a 59 y.o. female who is here for a follow up. Patient with IBS-C. Takes stool softener daily. Linzess did not work for her. Patient is scheduled for colonoscopy on 5/19/2025. She is currently living with her mother.     Colonoscopy 5/4/2022: Prep (2 days of CLD and 8 L of golytely), long redundant colon, 3 TA, repeat colon w/adult colonoscope and agg prep (2d CLD, 8L GoLytely) in 3 years.     Colonoscopy 5/3/2022:  Solid stool in the rectum and distal sigmoid limiting advancement of endoscope. IH. Begin Linzess 290 mcg by mouth daily.  We will focus on daily soft easy bowel movements and then we will attempt a repeat colonoscopy with aggressive prep.      Colonoscopy 2/1/2022: stool throughout left colon, unable to visualize for advancement. Repeat colonoscopy with agg pre    Review of Systems   Constitutional:  Positive for diaphoresis. Negative for fatigue, fever and unexpected weight change.   HENT:  Positive for mouth sores, postnasal drip and trouble swallowing. Negative for sore throat.    Eyes:  Positive for pain. Negative for discharge and eye dryness.   Respiratory:  Positive for chest tightness and shortness of breath. Negative for apnea, cough, choking and wheezing.    Cardiovascular:  Positive for chest pain and palpitations. Negative for leg swelling.   Gastrointestinal:  Positive for blood in stool and constipation. Negative for abdominal distention, abdominal pain, anal bleeding, change in bowel habit, diarrhea, nausea, rectal pain, vomiting, reflux and fecal incontinence.   Genitourinary:  Positive for hematuria. Negative for bladder incontinence and dysuria.   Musculoskeletal:  Positive for arthralgias, back pain and joint swelling.   Integumentary:  Negative for color  change and rash.   Allergic/Immunologic: Negative for environmental allergies and food allergies.   Neurological:  Negative for seizures and headaches.   Hematological:  Negative for adenopathy. Bruises/bleeds easily.        Past Medical History:   Diagnosis Date    Anxiety     BMI 26.0-26.9,adult 05/12/2025    Cervical cancer     Generalized anxiety disorder 10/25/2022    Hepatic steatosis 06/27/2023    History of cervical cancer 1993    Personal history of colonic polyps     Pure hypercholesterolemia 10/26/2022    Uterine cancer      Past Surgical History:   Procedure Laterality Date    CARPAL TUNNEL RELEASE Right     COLONOSCOPY      ganglionectomy Right     Wrist    HAND SURGERY Right     HYSTERECTOMY      SURGICAL REMOVAL OF LESION OF ORBIT Right     TRIGGER FINGER RELEASE Right     Middle Finger     Family History   Problem Relation Name Age of Onset    Hypertension Mother      Hypertension Father      Melanoma Maternal Uncle      Lung cancer Maternal Uncle      Bone cancer Maternal Grandfather      Colon cancer Neg Hx      Esophageal cancer Neg Hx      Inflammatory bowel disease Neg Hx      Irritable bowel syndrome Neg Hx      Liver cancer Neg Hx      Rectal cancer Neg Hx      Stomach cancer Neg Hx      Ulcerative colitis Neg Hx       Social History[1]  Review of patient's allergies indicates:   Allergen Reactions    Chantix [varenicline] Anxiety and Other (See Comments)     irritation    Clarithromycin     Codeine     Sulfa (sulfonamide antibiotics) Nausea And Vomiting      Medication List with Changes/Refills   Current Medications    ALPRAZOLAM (XANAX) 1 MG TABLET        ATROVENT HFA 17 MCG/ACTUATION INHALER    INHALE 2 PUFFS BY MOUTH INTO THE LUNGS EVERY 6 (SIX) HOURS AS NEEDED FOR WHEEZING/ FOR SHORTNESS OF BREATH    DICLOFENAC SODIUM (VOLTAREN) 1 % GEL    Apply 2 g topically 4 (four) times daily.    NYSTATIN (MYCOSTATIN) 100,000 UNIT/ML SUSPENSION    Take 4 mLs by mouth 4 (four) times daily.     "QUETIAPINE (SEROQUEL) 400 MG TABLET    Take 400 mg by mouth once daily.    ROSUVASTATIN (CRESTOR) 20 MG TABLET    Take 1 tablet (20 mg total) by mouth once daily.    UMECLIDINIUM-VILANTEROL (ANORO ELLIPTA) 62.5-25 MCG/ACTUATION DSDV    INHALE 1 PUFF INTO THE LUNGS ONCE DAILY.    VALACYCLOVIR (VALTREX) 1000 MG TABLET    Take 1 tablet (1,000 mg total) by mouth 2 (two) times daily.    VENLAFAXINE (EFFEXOR-XR) 150 MG CP24    Take 150 mg by mouth once daily.         Vital Signs:  /72 (BP Location: Left arm, Patient Position: Sitting)   Pulse 91   Ht 5' 9" (1.753 m)   Wt 80.6 kg (177 lb 9.6 oz)   SpO2 98%   BMI 26.23 kg/m²        Physical Exam  Vitals reviewed.   Constitutional:       General: She is awake. She is not in acute distress.     Appearance: Normal appearance. She is well-developed. She is not ill-appearing, toxic-appearing or diaphoretic.   HENT:      Head: Normocephalic and atraumatic.      Nose: Nose normal.      Mouth/Throat:      Mouth: Mucous membranes are moist.      Pharynx: Oropharynx is clear. No oropharyngeal exudate or posterior oropharyngeal erythema.   Eyes:      General: Lids are normal. Gaze aligned appropriately. No scleral icterus.        Right eye: No discharge.         Left eye: No discharge.      Conjunctiva/sclera: Conjunctivae normal.   Neck:      Trachea: Trachea normal.   Cardiovascular:      Rate and Rhythm: Normal rate and regular rhythm.      Pulses:           Radial pulses are 2+ on the right side and 2+ on the left side.   Pulmonary:      Effort: Pulmonary effort is normal. No respiratory distress.      Breath sounds: No stridor. No wheezing.   Chest:      Chest wall: No tenderness.   Abdominal:      General: Bowel sounds are normal. There is no distension.      Palpations: Abdomen is soft. There is no fluid wave, hepatomegaly or mass.      Tenderness: There is no abdominal tenderness. There is no guarding or rebound.   Musculoskeletal:         General: No tenderness or " deformity.      Cervical back: No tenderness.      Right lower leg: No edema.      Left lower leg: No edema.   Lymphadenopathy:      Cervical: No cervical adenopathy.   Skin:     General: Skin is warm and dry.      Capillary Refill: Capillary refill takes less than 2 seconds.      Coloration: Skin is not cyanotic, jaundiced or pale.   Neurological:      General: No focal deficit present.      Mental Status: She is alert and oriented to person, place, and time.      Motor: No tremor.   Psychiatric:         Attention and Perception: Attention normal.         Mood and Affect: Mood and affect normal.         Speech: Speech normal.         Behavior: Behavior normal. Behavior is cooperative.            All of the data above and below has been reviewed by myself and any further interpretations will be reflected in the assessment and plan.   The data includes review of external notes, and independent interpretation of lab results, procedures, x-rays, and imaging reports.      Assessment:  Irritable bowel syndrome with constipation    History of colon polyps    Colonoscopy due 5/2025 with aggr prep (8 L Golytely) and 2 days CLD. Has prep.   IBS-C: feels she is doing well with stool softener daily. Linzess did not work well.     Recommendations:    Proceed with colonoscopy on 5/19/2025. You will need 2 days of clear liquid diet and 8 liters of Golytely.   Take stool softener daily.      If any tests, procedures, or imaging has been ordered and you are not contacted to schedule within 1-2 weeks, then you may call the central scheduling department directly at [phone].     Risks, benefits, and alternatives of medical management, any associated procedures, and/or treatment discussed with the patient. Patient given opportunity to ask questions and voices understanding. Patient has elected to proceed with the recommended care modalities as discussed.    Follow up in about 1 year (around 5/12/2026).    Order summary:  No  orders of the defined types were placed in this encounter.         Instructed patient to notify my office if they have not been contacted within two weeks after any procedures, submitting any samples (biopsies, blood, stool, urine, etc.) or after any imaging (X-ray, CT, MRI, etc.).      Tierra Dunlap NP    This document may have been created using a voice recognition transcribing system. Incorrect words or phrases may have been missed during proofreading. Please interpret accordingly or contact me for clarification.          [1]   Social History  Tobacco Use    Smoking status: Every Day     Current packs/day: 0.50     Types: Cigarettes    Smokeless tobacco: Never   Substance Use Topics    Alcohol use: Not Currently     Comment: RARELY    Drug use: Never

## 2025-05-12 NOTE — TELEPHONE ENCOUNTER
Patient was given instructions and they were reviewed with patient. Patient was given AVS with apt details. 5/12/25 LRA

## 2025-05-12 NOTE — PATIENT INSTRUCTIONS
Proceed with colonoscopy on 5/19/2025. You will need 2 days of clear liquid diet and 8 liters of Golytely.   Take stool softener daily.    If any tests, procedures, or imaging has been ordered and you are not contacted to schedule within 1-2 weeks, then you may call the central scheduling department directly at [phone].   Please notify my office if you have not been contacted within two weeks after any procedures, submitting any samples (biopsies, blood, stool, urine, etc.) or after any imaging (X-ray, CT, MRI, etc.).

## 2025-05-19 ENCOUNTER — OUTSIDE PLACE OF SERVICE (OUTPATIENT)
Dept: GASTROENTEROLOGY | Facility: CLINIC | Age: 60
End: 2025-05-19

## 2025-05-19 LAB — CRC RECOMMENDATION EXT: NORMAL

## 2025-05-22 ENCOUNTER — TELEPHONE (OUTPATIENT)
Dept: GASTROENTEROLOGY | Facility: CLINIC | Age: 60
End: 2025-05-22
Payer: MEDICAID

## 2025-05-22 NOTE — TELEPHONE ENCOUNTER
Called patient let her know that colonoscopy results have not yet been reviewed once they are reviewed we will call with results. -abo

## 2025-05-22 NOTE — TELEPHONE ENCOUNTER
----- Message from Ajrosaliajanee sent at 5/21/2025  4:37 PM CDT -----  Contact: MILIND CLEMENTS [[id number]]  .Type:  Patient Requesting CallWho Called:MILIND CLEMENTS [[id number]]Does the patient know what this is regarding?:pt is calling to speak with nurse recent procedure, have questionsWould the patient rather a call back or a response via [facility]? callBe Call Back Number:.[phone] Information:

## 2025-05-28 ENCOUNTER — RESULTS FOLLOW-UP (OUTPATIENT)
Dept: GASTROENTEROLOGY | Facility: CLINIC | Age: 60
End: 2025-05-28
Payer: MEDICAID

## 2025-05-29 NOTE — TELEPHONE ENCOUNTER
2 TA, repeat colonoscopy w/adult colonoscope and extended prep in 5 years.     Notify patient. Confirm recall tab in appointment desk is up-to-date (update if needed).  NBP

## 2025-05-30 NOTE — TELEPHONE ENCOUNTER
Message  Received: Today  Dashawn Hou Staff  Caller: self (Today, 11:59 AM)  Type:  Patient Returning Call    Who Called:Elizabeth Gomez  Who Left Message for Patient:Vandana  Does the patient know what this is regarding?:no  Would the patient rather a call back or a response via [facility]? Call back  Best Call Back Number:[phone]  Additional Information: pt returning a phone call

## 2025-08-04 DIAGNOSIS — R21 RASH: Primary | ICD-10-CM

## 2025-08-04 RX ORDER — NYSTATIN 100000 [USP'U]/ML
SUSPENSION ORAL
Qty: 340 ML | Refills: 5 | Status: SHIPPED | OUTPATIENT
Start: 2025-08-04

## 2025-08-04 RX ORDER — NYSTATIN 100000 [USP'U]/ML
SUSPENSION ORAL
Qty: 340 ML | Refills: 5 | OUTPATIENT
Start: 2025-08-04

## 2025-08-21 DIAGNOSIS — R11.0 NAUSEA: Primary | ICD-10-CM

## 2025-08-25 RX ORDER — SCOPOLAMINE 1 MG/3D
1 PATCH, EXTENDED RELEASE TRANSDERMAL
Qty: 4 PATCH | Refills: 2 | Status: SHIPPED | OUTPATIENT
Start: 2025-08-25